# Patient Record
Sex: MALE | Race: WHITE | NOT HISPANIC OR LATINO | Employment: STUDENT | URBAN - METROPOLITAN AREA
[De-identification: names, ages, dates, MRNs, and addresses within clinical notes are randomized per-mention and may not be internally consistent; named-entity substitution may affect disease eponyms.]

---
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
## 2023-02-03 ENCOUNTER — OFFICE VISIT (OUTPATIENT)
Dept: PSYCHIATRY | Facility: CLINIC | Age: 18
End: 2023-02-03
Payer: COMMERCIAL

## 2023-02-03 VITALS
HEIGHT: 69 IN | SYSTOLIC BLOOD PRESSURE: 140 MMHG | BODY MASS INDEX: 26.48 KG/M2 | DIASTOLIC BLOOD PRESSURE: 87 MMHG | HEART RATE: 64 BPM | WEIGHT: 178.8 LBS

## 2023-02-03 DIAGNOSIS — F51.05 INSOMNIA DUE TO MENTAL DISORDER: ICD-10-CM

## 2023-02-03 DIAGNOSIS — F90.0 ADHD (ATTENTION DEFICIT HYPERACTIVITY DISORDER), INATTENTIVE TYPE: Primary | ICD-10-CM

## 2023-02-03 PROCEDURE — 99214 OFFICE O/P EST MOD 30 MIN: CPT | Performed by: NURSE PRACTITIONER

## 2023-02-03 PROCEDURE — 90833 PSYTX W PT W E/M 30 MIN: CPT | Performed by: NURSE PRACTITIONER

## 2023-02-03 RX ORDER — DEXTROAMPHETAMINE SACCHARATE, AMPHETAMINE ASPARTATE, DEXTROAMPHETAMINE SULFATE AND AMPHETAMINE SULFATE 3.75; 3.75; 3.75; 3.75 MG/1; MG/1; MG/1; MG/1
15 TABLET ORAL 2 TIMES DAILY
Qty: 60 TABLET | Refills: 0 | Status: SHIPPED | OUTPATIENT
Start: 2023-02-03 | End: 2023-03-05

## 2023-02-03 NOTE — PROGRESS NOTES
Subjective   Johnathan Fan is a 17 y.o. male who presents today for follow up.   This provider is located at [address] [place] [address], [address] in [place], [place]. In-person visit consisting of the patient and I only. The patient is accepting of and agreeable to this appointment.       Chief Complaint:  Inattention, insomnia    History of Present Illness:     Depression over the past month  Depressed mood: n   Markedly diminished interest or pleasure: n  Significant weight loss when not dieting or weight gain, or decrease or increase in appetite: n  Insomnia or hypersomnia: n  Psychomotor agitation or retardation: n  Fatigue or loss of energy: n  Feelings of worthlessness or excessive or inappropriate guilt: n  Diminished ability to think or concentrate, or indecisiveness: n  Recurrent thoughts of death, recurrent suicidal ideation without a specific plan, or suicide attempt or specific plan for committing suicide- denies    ADHD:   Inattention:   Often fails to give close attention to details or makes careless mistakes in schoolwork, at work, or during other activities- n  Often has difficulty sustaining attention in tasks- n  Often does not seem to listen when spoken to directly- n  Often does not follow through on instructions and fails to finish duties in the workplace- n  Often has difficulty organizing tasks and activities- n  Often avoids, dislikes or is reluctant to engage in tasks that require sustained mental effort- n  Often loses things necessary for tasks or activities- n  Is often easily distracted by extraneous stimuli- n  Is often forgetful in daily activities- n  Hyperactivity and Impulsivity:   Often fidgets with or taps hands or feet-n  Often leaves seat in situations when remaining seated is expected- n  Often feels restless- n  Is often “on the go”, acting as if “driven by a motor”- n  Often talks excessively- n  Often blurts out an answer before a question has been completed- n  Often  has difficulty waiting their turn- n  Often interrupts or intrudes on others- n    9/27/22 ADHD:   Elementary School   Grades- poor grades   Special classes or failures- denies   Behavioral issues- endorses   Referral for ADHD testing- denies  FHX- Denies  Presently:   Problems with attention to detail- y  Problems with sustained attention- y  Problems listening when spoken to directly- y  Failure to finish tasks- y  Avoids tasks that require sustained mental effort- y  Easily distracted- y  Forgetting things- y  Losing things- y  Hard to organize- y  Talks a lot and cutting people off- y  Drifts off during conversations- y  Reading- y    Psychiatric Review of Systems: Patient denies any current or previous depression, anxiety, hallucinations/delusions, paranoia, manic symptoms or PTSD.     PHQ-9 Depression Screening  PHQ-9 Total Score:      Little interest or pleasure in doing things?     Feeling down, depressed, or hopeless?     Trouble falling or staying asleep, or sleeping too much?     Feeling tired or having little energy?     Poor appetite or overeating?     Feeling bad about yourself - or that you are a failure or have let yourself or your family down?     Trouble concentrating on things, such as reading the newspaper or watching television?     Moving or speaking so slowly that other people could have noticed? Or the opposite - being so fidgety or restless that you have been moving around a lot more than usual?     Thoughts that you would be better off dead, or of hurting yourself in some way?     PHQ-9 Total Score       ASHLEY-7         Past Surgical History:  Past Surgical History:   Procedure Laterality Date   • WISDOM TOOTH EXTRACTION         Problem List:  Patient Active Problem List   Diagnosis   • Cough   • Seasonal allergies   • Congestion of nasal sinus   • Lack of concentration       Allergy:   No Known Allergies     Discontinued Medications:  There are no discontinued medications.    Current  Medications:   Current Outpatient Medications   Medication Sig Dispense Refill   • cetirizine (zyrTEC) 10 MG tablet Take 1 tablet by mouth Daily. (Patient taking differently: Take 10 mg by mouth Daily As Needed.) 90 tablet 3   • fluticasone (Flonase) 50 MCG/ACT nasal spray 2 sprays into the nostril(s) as directed by provider Daily. (Patient taking differently: 2 sprays into the nostril(s) as directed by provider Daily As Needed.) 16 g 3   • amphetamine-dextroamphetamine (Adderall) 15 MG tablet Take 1 tablet by mouth 2 (Two) Times a Day for 30 days. 60 tablet 0     No current facility-administered medications for this visit.       Past Medical History:  History reviewed. No pertinent past medical history.    Past Psychiatric History:  Began Treatment: 2022  Diagnoses: ADHD  Psychiatrist: Beatriz  Therapist: Denies  Admission History: Denies  Medication Trials: Strattera, wellbutrin  Self Harm: Denies  Suicide Attempts: Denies    Substance Abuse History:   Types: Denies  Withdrawal Symptoms: Not applicable  Longest Period Sober: Not applicable  AA: Not applicable    Social History:  Martial Status: Single  Employed: [employer]  Kids: Denies  House: Parents   History: Denies    Social History     Socioeconomic History   • Marital status: Single   Tobacco Use   • Smoking status: Never   • Smokeless tobacco: Never   Vaping Use   • Vaping Use: Never used   Substance and Sexual Activity   • Alcohol use: Yes     Comment: OCCASIONALLY   • Drug use: Never   • Sexual activity: Defer       Family History:   Suicide Attempts: Denies  Suicide Completions: Denies      Family History   Problem Relation Age of Onset   • Hypertension Father        Developmental History:   Born: [place]  Siblings: Brother  Childhood: Denies  High School: Graduate  College: Currently in college at [employer] with major in Business Management    Access to Firearms: Denies    Mental Status Exam:     Appearance: good eye contact, normal street clothes,  "groomed, sitting in chair   Behavior: pleasant and cooperative  Motor: no abnormal  Speech: normal rhythm, rate, volume, tone, not hyperverbal, not pressured, normal prosidy  Mood: \"Okay\"  Affect: euthymic  Thought Content: negative suicidal ideations, negative homicidal ideations, negative obsessions  Perceptions: negative auditory hallucinations, negative visual hallucinations, negative delusions, negative paranoia  Thought Process: goal directed, linear  Insight/Judgement: fair/fair  Cognition: grossly intact  Attention: intact  Orientation: person, place, time and situation  Memory: intact    Review of Systems:     Constitutional: Denies fatigue, night sweats  Eyes: Denies double vision, blurred vision  HENT: Denies vertigo, recent head injury  Cardiovascular: Denies chest pain, irregular heartbeats  Respiratory: Denies productive cough, shortness of breath  Gastrointestinal: Denies nausea, vomiting  Genitourinary: Denies dysuria, urinary retention  Integument: Denies hair growth change, new skin lesions  Neurologic: Denies altered mental status, seizures  Musculoskeletal: Denies joint swelling, limitation of motion  Endocrine: Denies cold intolerance, heat intolerance  Psychiatric: Admits anxiety, depression.  Denies psychosis, artem, post-traumatic stress disorder, obsessive compulsive disorder.   Allergic-immunologic: Denies frequent illnesses    Vital Signs:   /87   Pulse 64   Ht 175.3 cm (69\")   Wt 81.1 kg (178 lb 12.8 oz)   BMI 26.40 kg/m²      Lab Results:   Clinical Support on 09/27/2022   Component Date Value Ref Range Status   • Amphet/Methamphet, Screen 09/27/2022 Negative  Negative Final   • Barbiturates Screen, Urine 09/27/2022 Negative  Negative Final   • Benzodiazepine Screen, Urine 09/27/2022 Negative  Negative Final   • Cocaine Screen, Urine 09/27/2022 Negative  Negative Final   • Opiate Screen 09/27/2022 Negative  Negative Final   • THC, Screen, Urine 09/27/2022 Negative  Negative " Final   • Methadone Screen, Urine 09/27/2022 Negative  Negative Final   • Oxycodone Screen, Urine 09/27/2022 Negative  Negative Final   Office Visit on 05/17/2022   Component Date Value Ref Range Status   • SARS Antigen 05/17/2022 Not Detected  Not Detected, Presumptive Negative Final   • Influenza A Antigen JB 05/17/2022 Not Detected  Not Detected Final   • Influenza B Antigen JB 05/17/2022 Not Detected  Not Detected Final   • Internal Control 05/17/2022 Passed  Passed Final   • Lot Number 05/17/2022 [id number]   Final   • Expiration Date 05/17/2022 10/28/2022   Final   Lab on 02/14/2022   Component Date Value Ref Range Status   • Glucose 02/14/2022 83  65 - 99 mg/dL Final   • BUN 02/14/2022 13  6 - 20 mg/dL Final   • Creatinine 02/14/2022 1.06  0.76 - 1.27 mg/dL Final   • Sodium 02/14/2022 138  136 - 145 mmol/L Final   • Potassium 02/14/2022 4.3  3.5 - 5.2 mmol/L Final    Slight hemolysis detected by analyzer. Results may be affected.   • Chloride 02/14/2022 101  98 - 107 mmol/L Final   • CO2 02/14/2022 27.8  22.0 - 29.0 mmol/L Final   • Calcium 02/14/2022 9.7  8.6 - 10.5 mg/dL Final   • Total Protein 02/14/2022 7.4  6.0 - 8.5 g/dL Final   • Albumin 02/14/2022 5.00  3.50 - 5.20 g/dL Final   • ALT (SGPT) 02/14/2022 17  1 - 41 U/L Final   • AST (SGOT) 02/14/2022 20  1 - 40 U/L Final    Slight hemolysis detected by analyzer. Results may be affected.   • Alkaline Phosphatase 02/14/2022 95  56 - 127 U/L Final   • Total Bilirubin 02/14/2022 0.6  0.0 - 1.2 mg/dL Final   • eGFR Non  Amer 02/14/2022 91  >60 mL/min/1.73 Final   • Globulin 02/14/2022 2.4  gm/dL Final   • A/G Ratio 02/14/2022 2.1  g/dL Final   • BUN/Creatinine Ratio 02/14/2022 12.3  7.0 - 25.0 Final   • Anion Gap 02/14/2022 9.2  5.0 - 15.0 mmol/L Final   • TSH 02/14/2022 1.430  0.270 - 4.200 uIU/mL Final   • Hepatitis C Ab 02/14/2022 Non-Reactive  Non-Reactive Final   • WBC 02/14/2022 9.29  3.40 - 10.80 10*3/mm3 Final   • RBC 02/14/2022 5.45  4.14 -  5.80 10*6/mm3 Final   • Hemoglobin 02/14/2022 15.7  13.0 - 17.7 g/dL Final   • Hematocrit 02/14/2022 45.8  37.5 - 51.0 % Final   • MCV 02/14/2022 84.0  79.0 - 97.0 fL Final   • MCH 02/14/2022 28.8  26.6 - 33.0 pg Final   • MCHC 02/14/2022 34.3  31.5 - 35.7 g/dL Final   • RDW 02/14/2022 12.6  12.3 - 15.4 % Final   • RDW-SD 02/14/2022 37.8  37.0 - 54.0 fl Final   • MPV 02/14/2022 11.7  6.0 - 12.0 fL Final   • Platelets 02/14/2022 296  140 - 450 10*3/mm3 Final   • Neutrophil % 02/14/2022 75.4  42.7 - 76.0 % Final   • Lymphocyte % 02/14/2022 14.4 (L)  19.6 - 45.3 % Final   • Monocyte % 02/14/2022 8.3  5.0 - 12.0 % Final   • Eosinophil % 02/14/2022 0.8  0.3 - 6.2 % Final   • Basophil % 02/14/2022 0.8  0.0 - 1.5 % Final   • Immature Grans % 02/14/2022 0.3  0.0 - 0.5 % Final   • Neutrophils, Absolute 02/14/2022 7.01 (H)  1.70 - 7.00 10*3/mm3 Final   • Lymphocytes, Absolute 02/14/2022 1.34  0.70 - 3.10 10*3/mm3 Final   • Monocytes, Absolute 02/14/2022 0.77  0.10 - 0.90 10*3/mm3 Final   • Eosinophils, Absolute 02/14/2022 0.07  0.00 - 0.40 10*3/mm3 Final   • Basophils, Absolute 02/14/2022 0.07  0.00 - 0.20 10*3/mm3 Final   • Immature Grans, Absolute 02/14/2022 0.03  0.00 - 0.05 10*3/mm3 Final   • nRBC 02/14/2022 0.0  0.0 - 0.2 /100 WBC Final       EKG Results:  No orders to display       Imaging Results:  No Images in the past 120 days found..      Assessment & Plan   Diagnoses and all orders for this visit:    1. ADHD (attention deficit hyperactivity disorder), inattentive type (Primary)  -     amphetamine-dextroamphetamine (Adderall) 15 MG tablet; Take 1 tablet by mouth 2 (Two) Times a Day for 30 days.  Dispense: 60 tablet; Refill: 0    2. Insomnia due to mental disorder      Continue adderall ir to target ADHD symptoms. Continue melatonin to target insomnia. 16 minutes of supportive psychotherapy with goal to strengthen defenses, promote problems solving, restore adaptive functioning and provide symptom relief. The  therapeutic alliance was strengthened to encourage the patient to express their thoughts and feelings. Esteem building was enhanced through praise, reassurance, normalizing and encouragement. Coping skills were enhanced to build distress tolerance skills and emotional regulation. Allowed patient to freely discuss issues without interruption or judgement with unconditional positive regard, active listening skills, and empathy. Provided a safe, confidential environment to facilitate the development of a positive therapeutic relationship and encourage open, honest communication. Assisted patient in identifying risk factors which would indicate the need for higher level of care including thoughts to harm self or others and/or self-harming behavior and encouraged patient to contact this office, call 911, or present to the nearest emergency room should any of these events occur. Assisted patient in processing session content; acknowledged and normalized patient's thoughts, feelings, and concerns by utilizing a person-centered approach in efforts to build appropriate rapport and a positive therapeutic relationship with open and honest communication. Patient given education on medication side effects, diagnosis/illness and relapse symptoms. Plan to continue supportive psychotherapy in next appointment to provide symptom relief. 4 weeks    Diagnoses: as above  Symptoms: as above  Functional status: good  Mental Status Exam: as above     Treatment plan: medication management and supportive psychotherapy  Prognosis: good  Progress: continued improvement    Visit Diagnoses:    ICD-10-CM ICD-9-CM   1. ADHD (attention deficit hyperactivity disorder), inattentive type  F90.0 314.00   2. Insomnia due to mental disorder  F51.05 300.9     327.02       PLAN:  1. Safety: No acute safety concerns.   2. Therapy: Declines  3. Risk Assessment: Risk of self-harm acutely is low.  Risk factors include recent psychosocial stressors (pandemic).  Protective factors include no family history, denies access to guns/weapons, no present SI, no history of suicide attempts or self-harm in the past, minimal AODA, healthcare seeking, future orientation, willingness to engage in care.  Risk of self-harm chronically is also low, but could be further elevated in the event of treatment noncompliance and/or AODA.  4. Medications: Continue adderall ir 15mg po bid to target ADHD symptoms. Risks, benefits, side effects discussed with patient including elevated heart rate, elevated blood pressure, irritability, insomnia, sexual dysfunction, appetite suppressing properties, psychosis.  After discussion of these risks and benefits, the patient voiced understanding and agreed to proceed. Dereck reviewed, UDS ordered, and controlled substance agreement signed & witnessed. Continue melatonin 5mg po qhs to target insomnia. Risks, benefits, side effects discussed with patient including sedation, dizziness/falls risk, GI upset.  After discussion of these risks and benefits, the patient voiced understanding and agreed to proceed.   5. Labs/studies: UDS 9/27/22 negative  6. Follow-up: 8 weeks    Patient screened positive for depression based on a PHQ-9 score of 11 on 9/27/2022. Follow-up recommendations include: Suicide Risk Assessment performed.         TREATMENT PLAN/GOALS: Continue supportive psychotherapy efforts and medications as indicated. Treatment and medication options discussed during today's visit. Patient ackowledged and verbally consented to continue with current treatment plan and was educated on the importance of compliance with treatment and follow-up appointments.      MEDICATION ISSUES:  DERECK reviewed as expected.  Discussed medication options and treatment plan of prescribed medication as well as the risks, benefits, and side effects including potential falls, possible impaired driving and metabolic adversities among others. Patient is agreeable to call the  office with any worsening of symptoms or onset of side effects. Patient is agreeable to call 911 or go to the nearest ER should he/she begin having SI/HI. No medication side effects or related complaints today.     MEDS ORDERED DURING VISIT:  New Medications Ordered This Visit   Medications   • amphetamine-dextroamphetamine (Adderall) 15 MG tablet     Sig: Take 1 tablet by mouth 2 (Two) Times a Day for 30 days.     Dispense:  60 tablet     Refill:  0       Return in about 8 weeks (around 3/31/2023) for Next scheduled follow up.         This document has been electronically signed by ANNIA Park  February 3, 2023 08:19 EST      Part of this note may be an electronic transcription/translation of spoken language to printed text using the Dragon Dictation System.

## 2023-02-08 ENCOUNTER — OFFICE VISIT (OUTPATIENT)
Dept: INTERNAL MEDICINE | Facility: CLINIC | Age: 18
End: 2023-02-08
Payer: COMMERCIAL

## 2023-02-08 VITALS
DIASTOLIC BLOOD PRESSURE: 80 MMHG | SYSTOLIC BLOOD PRESSURE: 139 MMHG | HEIGHT: 69 IN | TEMPERATURE: 98.1 F | BODY MASS INDEX: 26.33 KG/M2 | WEIGHT: 177.8 LBS | OXYGEN SATURATION: 97 % | HEART RATE: 82 BPM

## 2023-02-08 DIAGNOSIS — M25.531 RIGHT WRIST PAIN: Primary | ICD-10-CM

## 2023-02-08 PROCEDURE — 99213 OFFICE O/P EST LOW 20 MIN: CPT | Performed by: NURSE PRACTITIONER

## 2023-02-08 NOTE — PROGRESS NOTES
"Chief Complaint  right hand/wrist pain (He states his wrist started hurting 2 weeks ago. He states he doesn't remember hurting it in any way. He does work in [employer] and goes to the gym.)  Subjective      History of Present Illness  Johnathan Fan is a 17 y.o. male who presents to [facility] for complaint of right wrist pain for 2 weeks. The pain is located on the ulnar side and only occurs with a twisting motion. He is right-hand dominant. He denies a fall or known injury.  He does [employer] for work and had been going to the gym and lifting weights. He has stopped lifting weights. He has used Tylenol.      Past Medical History:   Diagnosis Date   • Seasonal allergies 8/31/2022        Past Surgical History:   Procedure Laterality Date   • WISDOM TOOTH EXTRACTION          No Known Allergies       Current Outpatient Medications:   •  amphetamine-dextroamphetamine (Adderall) 15 MG tablet, Take 1 tablet by mouth 2 (Two) Times a Day for 30 days., Disp: 60 tablet, Rfl: 0  •  cetirizine (zyrTEC) 10 MG tablet, Take 1 tablet by mouth Daily. (Patient taking differently: Take 10 mg by mouth Daily As Needed.), Disp: 90 tablet, Rfl: 3  •  fluticasone (Flonase) 50 MCG/ACT nasal spray, 2 sprays into the nostril(s) as directed by provider Daily. (Patient taking differently: 2 sprays into the nostril(s) as directed by provider Daily As Needed.), Disp: 16 g, Rfl: 3  •  diclofenac (VOLTAREN) 50 MG EC tablet, Take 1 tablet by mouth 2 (Two) Times a Day. Take with food., Disp: 60 tablet, Rfl: 0    Objective   /80 (BP Location: Right arm, Patient Position: Sitting, Cuff Size: Large Adult)   Pulse 82   Temp 98.1 °F (36.7 °C) (Temporal)   Ht 175.3 cm (69\")   Wt 80.6 kg (177 lb 12.8 oz)   SpO2 97%   BMI 26.26 kg/m²    Estimated body mass index is 26.26 kg/m² as calculated from the following:    Height as of this encounter: 175.3 cm (69\").    Weight as of this encounter: 80.6 kg (177 " lb 12.8 oz).   Physical Exam  Vitals reviewed.   Constitutional:       General: He is not in acute distress.  HENT:      Head: Normocephalic and atraumatic.   Pulmonary:      Effort: Pulmonary effort is normal.   Musculoskeletal:      Right wrist: Tenderness present. No swelling or deformity. Normal range of motion.      Comments: Tenderness to palpation of wrist on ulnar side; pain with rotation.   Neurological:      General: No focal deficit present.      Mental Status: He is alert.   Psychiatric:         Thought Content: Thought content normal.           Assessment and Plan   Diagnoses and all orders for this visit:    1. Right wrist pain (Primary)    Other orders  -     diclofenac (VOLTAREN) 50 MG EC tablet; Take 1 tablet by mouth 2 (Two) Times a Day. Take with food.  Dispense: 60 tablet; Refill: 0        Differential diagnosis discussed including strain, tendonitis, etc. Start diclofenac 50 mg twice a day with food. Ice/heat several times a day. Use a thumb spica plint or wrist compression to help immobilize during work. No weight lifting for now. Once improved start rehab exercises. Education on medication and treatment plan.    Patient was given instructions and counseling regarding his condition. Please see specific information pulled into the AVS if appropriate.     Follow Up   Return if symptoms worsen or fail to improve.    ANNIA Darling

## 2023-03-08 ENCOUNTER — TELEPHONE (OUTPATIENT)
Dept: PSYCHIATRY | Facility: CLINIC | Age: 18
End: 2023-03-08
Payer: COMMERCIAL

## 2023-03-08 DIAGNOSIS — F90.0 ADHD (ATTENTION DEFICIT HYPERACTIVITY DISORDER), INATTENTIVE TYPE: Primary | ICD-10-CM

## 2023-03-08 RX ORDER — DEXTROAMPHETAMINE SACCHARATE, AMPHETAMINE ASPARTATE, DEXTROAMPHETAMINE SULFATE AND AMPHETAMINE SULFATE 3.75; 3.75; 3.75; 3.75 MG/1; MG/1; MG/1; MG/1
15 TABLET ORAL 2 TIMES DAILY
Qty: 60 TABLET | Refills: 0 | Status: SHIPPED | OUTPATIENT
Start: 2023-03-08 | End: 2023-04-07

## 2023-03-08 NOTE — TELEPHONE ENCOUNTER
Pt left voicemail to request refill for Adderall 15mg.     amphetamine-dextroamphetamine (Adderall) 15 MG tablet (2023)    Pt has upcoming appt on 2023.     Please reorder medication to [facility], as the active order has  off of pt's active medication list.

## 2023-04-07 DIAGNOSIS — F90.0 ADHD (ATTENTION DEFICIT HYPERACTIVITY DISORDER), INATTENTIVE TYPE: ICD-10-CM

## 2023-04-07 RX ORDER — DEXTROAMPHETAMINE SACCHARATE, AMPHETAMINE ASPARTATE, DEXTROAMPHETAMINE SULFATE AND AMPHETAMINE SULFATE 3.75; 3.75; 3.75; 3.75 MG/1; MG/1; MG/1; MG/1
TABLET ORAL
Qty: 60 TABLET | Refills: 0 | Status: SHIPPED | OUTPATIENT
Start: 2023-04-07

## 2023-05-12 ENCOUNTER — OFFICE VISIT (OUTPATIENT)
Dept: PSYCHIATRY | Facility: CLINIC | Age: 18
End: 2023-05-12
Payer: COMMERCIAL

## 2023-05-12 VITALS
HEIGHT: 69 IN | WEIGHT: 172 LBS | SYSTOLIC BLOOD PRESSURE: 125 MMHG | DIASTOLIC BLOOD PRESSURE: 71 MMHG | BODY MASS INDEX: 25.48 KG/M2 | HEART RATE: 69 BPM

## 2023-05-12 DIAGNOSIS — F51.05 INSOMNIA DUE TO MENTAL DISORDER: ICD-10-CM

## 2023-05-12 DIAGNOSIS — F90.0 ADHD (ATTENTION DEFICIT HYPERACTIVITY DISORDER), INATTENTIVE TYPE: Primary | ICD-10-CM

## 2023-05-12 RX ORDER — DEXTROAMPHETAMINE SACCHARATE, AMPHETAMINE ASPARTATE, DEXTROAMPHETAMINE SULFATE AND AMPHETAMINE SULFATE 5; 5; 5; 5 MG/1; MG/1; MG/1; MG/1
20 TABLET ORAL 2 TIMES DAILY
Qty: 60 TABLET | Refills: 0 | Status: SHIPPED | OUTPATIENT
Start: 2023-05-12 | End: 2023-06-11

## 2023-05-12 NOTE — PROGRESS NOTES
Subjective   Johnathan Fan is a 18 y.o. male who presents today for follow up.   This provider is located at [address] [place] [address], [address] in [place], [place]. In-person visit consisting of the patient and I only. The patient is accepting of and agreeable to this appointment.       Chief Complaint:  Inattention, insomnia    History of Present Illness:     1. Sleep disturbance: denies  2. Low energy: denies  3. Appetite change: denies  4. Medication compliant  5. Side effects: denies  6. Refills needed  7. Denies SI HI AVH    ADHD: feel as though wearing off earlier in the day  Inattention:   Often fails to give close attention to details or makes careless mistakes in schoolwork, at work, or during other activities- n  Often has difficulty sustaining attention in tasks- n  Often does not seem to listen when spoken to directly- n  Often does not follow through on instructions and fails to finish duties in the workplace- n  Often has difficulty organizing tasks and activities- n  Often avoids, dislikes or is reluctant to engage in tasks that require sustained mental effort- n  Often loses things necessary for tasks or activities- n  Is often easily distracted by extraneous stimuli- n  Is often forgetful in daily activities- n  Hyperactivity and Impulsivity:   Often fidgets with or taps hands or feet-n  Often leaves seat in situations when remaining seated is expected- n  Often feels restless- n  Is often “on the go”, acting as if “driven by a motor”- n  Often talks excessively- n  Often blurts out an answer before a question has been completed- n  Often has difficulty waiting their turn- n  Often interrupts or intrudes on others- n    9/27/22 ADHD:   Elementary School   Grades- poor grades   Special classes or failures- denies   Behavioral issues- endorses   Referral for ADHD testing- denies  FHX- Denies  Presently:   Problems with attention to detail- y  Problems with sustained attention- y  Problems  listening when spoken to directly- y  Failure to finish tasks- y  Avoids tasks that require sustained mental effort- y  Easily distracted- y  Forgetting things- y  Losing things- y  Hard to organize- y  Talks a lot and cutting people off- y  Drifts off during conversations- y  Reading- y    Psychiatric Review of Systems: Patient denies any current or previous depression, anxiety, hallucinations/delusions, paranoia, manic symptoms or PTSD.     PHQ-9 Depression Screening  PHQ-9 Total Score:      Little interest or pleasure in doing things?     Feeling down, depressed, or hopeless?     Trouble falling or staying asleep, or sleeping too much?     Feeling tired or having little energy?     Poor appetite or overeating?     Feeling bad about yourself - or that you are a failure or have let yourself or your family down?     Trouble concentrating on things, such as reading the newspaper or watching television?     Moving or speaking so slowly that other people could have noticed? Or the opposite - being so fidgety or restless that you have been moving around a lot more than usual?     Thoughts that you would be better off dead, or of hurting yourself in some way?     PHQ-9 Total Score       ASHLEY-7         Past Surgical History:  Past Surgical History:   Procedure Laterality Date   • WISDOM TOOTH EXTRACTION         Problem List:  Patient Active Problem List   Diagnosis   • Cough   • Seasonal allergies   • Congestion of nasal sinus   • Lack of concentration       Allergy:   No Known Allergies     Discontinued Medications:  Medications Discontinued During This Encounter   Medication Reason   • amphetamine-dextroamphetamine (ADDERALL) 15 MG tablet Historical Med - Therapy completed       Current Medications:   Current Outpatient Medications   Medication Sig Dispense Refill   • cetirizine (zyrTEC) 10 MG tablet Take 1 tablet by mouth Daily. (Patient taking differently: Take 1 tablet by mouth Daily As Needed.) 90 tablet 3   •  fluticasone (Flonase) 50 MCG/ACT nasal spray 2 sprays into the nostril(s) as directed by provider Daily. (Patient taking differently: 2 sprays into the nostril(s) as directed by provider Daily As Needed.) 16 g 3   • amphetamine-dextroamphetamine (Adderall) 20 MG tablet Take 1 tablet by mouth 2 (Two) Times a Day for 30 days. 60 tablet 0     No current facility-administered medications for this visit.       Past Medical History:  Past Medical History:   Diagnosis Date   • Seasonal allergies 8/31/2022       Past Psychiatric History:  Began Treatment: 2022  Diagnoses: ADHD  Psychiatrist: Beatriz  Therapist: Denies  Admission History: Denies  Medication Trials: Strattera, wellbutrin  Self Harm: Denies  Suicide Attempts: Denies    Substance Abuse History:   Types: Denies  Withdrawal Symptoms: Not applicable  Longest Period Sober: Not applicable  AA: Not applicable    Social History:  Martial Status: Single  Employed: [employer]  Kids: Denies  House: Parents   History: Denies    Social History     Socioeconomic History   • Marital status: Single   Tobacco Use   • Smoking status: Never     Passive exposure: Never   • Smokeless tobacco: Never   Vaping Use   • Vaping Use: Never used   Substance and Sexual Activity   • Alcohol use: Yes     Comment: OCCASIONALLY   • Drug use: Never   • Sexual activity: Defer       Family History:   Suicide Attempts: Denies  Suicide Completions: Denies      Family History   Problem Relation Age of Onset   • Hypertension Father        Developmental History:   Born: [place]  Siblings: Brother  Childhood: Denies  High School: Graduate  College: Currently in college at [employer] with major in Business Management    Access to Firearms: Denies    Mental Status Exam:     Appearance: good eye contact, normal street clothes, groomed, sitting in chair   Behavior: pleasant and cooperative  Motor: no abnormal  Speech: normal rhythm, rate, volume, tone, not hyperverbal, not pressured, normal prosidy  Mood:  "\"Okay\"  Affect: euthymic  Thought Content: negative suicidal ideations, negative homicidal ideations, negative obsessions  Perceptions: negative auditory hallucinations, negative visual hallucinations, negative delusions, negative paranoia  Thought Process: goal directed, linear  Insight/Judgement: fair/fair  Cognition: grossly intact  Attention: intact  Orientation: person, place, time and situation  Memory: intact    Review of Systems:     Constitutional: Denies fatigue, night sweats  Eyes: Denies double vision, blurred vision  HENT: Denies vertigo, recent head injury  Cardiovascular: Denies chest pain, irregular heartbeats  Respiratory: Denies productive cough, shortness of breath  Gastrointestinal: Denies nausea, vomiting  Genitourinary: Denies dysuria, urinary retention  Integument: Denies hair growth change, new skin lesions  Neurologic: Denies altered mental status, seizures  Musculoskeletal: Denies joint swelling, limitation of motion  Endocrine: Denies cold intolerance, heat intolerance  Psychiatric: Admits anxiety, depression.  Denies psychosis, artem, post-traumatic stress disorder, obsessive compulsive disorder.   Allergic-immunologic: Denies frequent illnesses    Vital Signs:   /71   Pulse 69   Ht 175.3 cm (69\")   Wt 78 kg (172 lb)   BMI 25.40 kg/m²      Lab Results:   Admission on 03/27/2023, Discharged on 03/27/2023   Component Date Value Ref Range Status   • Rapid Strep A Screen 03/27/2023 Positive (A)  Negative, VALID, INVALID, Not Performed Final   • Internal Control 03/27/2023 Passed  Passed Final   • Lot Number 03/27/2023 [id number]   Final   • Expiration Date 03/27/2023 [id number]   Final   Clinical Support on 09/27/2022   Component Date Value Ref Range Status   • Amphet/Methamphet, Screen 09/27/2022 Negative  Negative Final   • Barbiturates Screen, Urine 09/27/2022 Negative  Negative Final   • Benzodiazepine Screen, Urine 09/27/2022 Negative  Negative Final   • Cocaine Screen, Urine " 09/27/2022 Negative  Negative Final   • Opiate Screen 09/27/2022 Negative  Negative Final   • THC, Screen, Urine 09/27/2022 Negative  Negative Final   • Methadone Screen, Urine 09/27/2022 Negative  Negative Final   • Oxycodone Screen, Urine 09/27/2022 Negative  Negative Final   Office Visit on 05/17/2022   Component Date Value Ref Range Status   • SARS Antigen 05/17/2022 Not Detected  Not Detected, Presumptive Negative Final   • Influenza A Antigen JB 05/17/2022 Not Detected  Not Detected Final   • Influenza B Antigen JB 05/17/2022 Not Detected  Not Detected Final   • Internal Control 05/17/2022 Passed  Passed Final   • Lot Number 05/17/2022 [id number]   Final   • Expiration Date 05/17/2022 10/28/2022   Final       EKG Results:  No orders to display       Imaging Results:  No Images in the past 120 days found..      Assessment & Plan   Diagnoses and all orders for this visit:    1. ADHD (attention deficit hyperactivity disorder), inattentive type (Primary)  -     amphetamine-dextroamphetamine (Adderall) 20 MG tablet; Take 1 tablet by mouth 2 (Two) Times a Day for 30 days.  Dispense: 60 tablet; Refill: 0    2. Insomnia due to mental disorder      Increase adderall ir to target ADHD symptoms. Continue melatonin to target insomnia. 16 minutes of supportive psychotherapy with goal to strengthen defenses, promote problems solving, restore adaptive functioning and provide symptom relief. The therapeutic alliance was strengthened to encourage the patient to express their thoughts and feelings. Esteem building was enhanced through praise, reassurance, normalizing and encouragement. Coping skills were enhanced to build distress tolerance skills and emotional regulation. Allowed patient to freely discuss issues without interruption or judgement with unconditional positive regard, active listening skills, and empathy. Provided a safe, confidential environment to facilitate the development of a positive therapeutic  relationship and encourage open, honest communication. Assisted patient in identifying risk factors which would indicate the need for higher level of care including thoughts to harm self or others and/or self-harming behavior and encouraged patient to contact this office, call 911, or present to the nearest emergency room should any of these events occur. Assisted patient in processing session content; acknowledged and normalized patient's thoughts, feelings, and concerns by utilizing a person-centered approach in efforts to build appropriate rapport and a positive therapeutic relationship with open and honest communication. Patient given education on medication side effects, diagnosis/illness and relapse symptoms. Plan to continue supportive psychotherapy in next appointment to provide symptom relief. 4 weeks    Diagnoses: as above  Symptoms: as above  Functional status: good  Mental Status Exam: as above     Treatment plan: medication management and supportive psychotherapy  Prognosis: good  Progress: continued improvement    Visit Diagnoses:    ICD-10-CM ICD-9-CM   1. ADHD (attention deficit hyperactivity disorder), inattentive type  F90.0 314.00   2. Insomnia due to mental disorder  F51.05 300.9     327.02       PLAN:  8. Safety: No acute safety concerns.   9. Therapy: Declines  10. Risk Assessment: Risk of self-harm acutely is low.  Risk factors include recent psychosocial stressors (pandemic). Protective factors include no family history, denies access to guns/weapons, no present SI, no history of suicide attempts or self-harm in the past, minimal AODA, healthcare seeking, future orientation, willingness to engage in care.  Risk of self-harm chronically is also low, but could be further elevated in the event of treatment noncompliance and/or AODA.  11. Medications: Increase adderall ir 15mg to 20mg po bid to target ADHD symptoms. Risks, benefits, side effects discussed with patient including elevated heart  rate, elevated blood pressure, irritability, insomnia, sexual dysfunction, appetite suppressing properties, psychosis.  After discussion of these risks and benefits, the patient voiced understanding and agreed to proceed. Dereck reviewed, UDS ordered, and controlled substance agreement signed & witnessed. Continue melatonin 5mg po qhs to target insomnia. Risks, benefits, side effects discussed with patient including sedation, dizziness/falls risk, GI upset.  After discussion of these risks and benefits, the patient voiced understanding and agreed to proceed.   12. Labs/studies: UDS 9/27/22 negative  13. Follow-up: 4 weeks    Patient screened positive for depression based on a PHQ-9 score of 11 on 9/27/2022. Follow-up recommendations include: Suicide Risk Assessment performed.         TREATMENT PLAN/GOALS: Continue supportive psychotherapy efforts and medications as indicated. Treatment and medication options discussed during today's visit. Patient ackowledged and verbally consented to continue with current treatment plan and was educated on the importance of compliance with treatment and follow-up appointments.      MEDICATION ISSUES:  DERECK reviewed as expected.  Discussed medication options and treatment plan of prescribed medication as well as the risks, benefits, and side effects including potential falls, possible impaired driving and metabolic adversities among others. Patient is agreeable to call the office with any worsening of symptoms or onset of side effects. Patient is agreeable to call 911 or go to the nearest ER should he/she begin having SI/HI. No medication side effects or related complaints today.     MEDS ORDERED DURING VISIT:  New Medications Ordered This Visit   Medications   • amphetamine-dextroamphetamine (Adderall) 20 MG tablet     Sig: Take 1 tablet by mouth 2 (Two) Times a Day for 30 days.     Dispense:  60 tablet     Refill:  0     Generic only. Do not substitute.       Return in about 4  weeks (around 6/9/2023) for Next scheduled follow up.         This document has been electronically signed by ANNIA Park  May 12, 2023 15:16 EDT      Part of this note may be an electronic transcription/translation of spoken language to printed text using the Dragon Dictation System.

## 2023-06-09 ENCOUNTER — TELEMEDICINE (OUTPATIENT)
Dept: PSYCHIATRY | Facility: CLINIC | Age: 18
End: 2023-06-09
Payer: COMMERCIAL

## 2023-06-09 DIAGNOSIS — F90.0 ADHD (ATTENTION DEFICIT HYPERACTIVITY DISORDER), INATTENTIVE TYPE: Primary | ICD-10-CM

## 2023-06-09 DIAGNOSIS — F51.05 INSOMNIA DUE TO MENTAL DISORDER: ICD-10-CM

## 2023-06-09 RX ORDER — DEXTROAMPHETAMINE SACCHARATE, AMPHETAMINE ASPARTATE, DEXTROAMPHETAMINE SULFATE AND AMPHETAMINE SULFATE 5; 5; 5; 5 MG/1; MG/1; MG/1; MG/1
20 TABLET ORAL 2 TIMES DAILY
Qty: 60 TABLET | Refills: 0 | Status: SHIPPED | OUTPATIENT
Start: 2023-06-10 | End: 2023-07-10

## 2023-06-09 NOTE — PROGRESS NOTES
"Subjective   Johnathan Miguel Fan is a 18 y.o. male who presents today for follow up.   Mode of visit: Video  Location [address] provider[address], [address], [place] [address].  Location of patient: Home  Does the patient consent to use a video/audio connection for your medical care today? Yes  The visit included audio and video interaction. No technical issues occurred during this visit.  This provider is located at [address], [address] in [place], [place].      Chief Complaint:  Inattention, insomnia    History of Present Illness:       Sleep disturbance: has improved  \"Sleeping a lot better\"  Low energy: denies  Low motivation/Interest: denies  Appetite change: denies  Medication compliant  Side effects: denies  Refills needed  Denies SI HI AVH    ADHD:   Inattention:   Often fails to give close attention to details or makes careless mistakes in schoolwork, at work, or during other activities- n  Often has difficulty sustaining attention in tasks- n  Often does not seem to listen when spoken to directly- n  Often does not follow through on instructions and fails to finish duties in the workplace- n  Often has difficulty organizing tasks and activities- n  Often avoids, dislikes or is reluctant to engage in tasks that require sustained mental effort- n  Often loses things necessary for tasks or activities- n  Is often easily distracted by extraneous stimuli- n  Is often forgetful in daily activities- n  Hyperactivity and Impulsivity:   Often fidgets with or taps hands or feet-n  Often leaves seat in situations when remaining seated is expected- n  Often feels restless- n  Is often “on the go”, acting as if “driven by a motor”- n  Often talks excessively- n  Often blurts out an answer before a question has been completed- n  Often has difficulty waiting their turn- n  Often interrupts or intrudes on others- n    9/27/22 ADHD:   Elementary School   Grades- poor grades   Special classes " or failures- denies   Behavioral issues- endorses   Referral for ADHD testing- denies  FHX- Denies  Presently:   Problems with attention to detail- y  Problems with sustained attention- y  Problems listening when spoken to directly- y  Failure to finish tasks- y  Avoids tasks that require sustained mental effort- y  Easily distracted- y  Forgetting things- y  Losing things- y  Hard to organize- y  Talks a lot and cutting people off- y  Drifts off during conversations- y  Reading- y    Psychiatric Review of Systems: Patient denies any current or previous depression, anxiety, hallucinations/delusions, paranoia, manic symptoms or PTSD.     PHQ-9 Depression Screening  PHQ-9 Total Score:      Little interest or pleasure in doing things?     Feeling down, depressed, or hopeless?     Trouble falling or staying asleep, or sleeping too much?     Feeling tired or having little energy?     Poor appetite or overeating?     Feeling bad about yourself - or that you are a failure or have let yourself or your family down?     Trouble concentrating on things, such as reading the newspaper or watching television?     Moving or speaking so slowly that other people could have noticed? Or the opposite - being so fidgety or restless that you have been moving around a lot more than usual?     Thoughts that you would be better off dead, or of hurting yourself in some way?     PHQ-9 Total Score       ASHLEY-7         Past Surgical History:  Past Surgical History:   Procedure Laterality Date    WISDOM TOOTH EXTRACTION         Problem List:  Patient Active Problem List   Diagnosis    Cough    Seasonal allergies    Congestion of nasal sinus    Lack of concentration       Allergy:   No Known Allergies     Discontinued Medications:  Medications Discontinued During This Encounter   Medication Reason    amphetamine-dextroamphetamine (Adderall) 20 MG tablet Reorder         Current Medications:   Current Outpatient Medications   Medication Sig Dispense  Refill    [START ON 6/10/2023] amphetamine-dextroamphetamine (Adderall) 20 MG tablet Take 1 tablet by mouth 2 (Two) Times a Day for 30 days. 60 tablet 0    cetirizine (zyrTEC) 10 MG tablet Take 1 tablet by mouth Daily. (Patient taking differently: Take 1 tablet by mouth Daily As Needed.) 90 tablet 3    fluticasone (Flonase) 50 MCG/ACT nasal spray 2 sprays into the nostril(s) as directed by provider Daily. (Patient taking differently: 2 sprays into the nostril(s) as directed by provider Daily As Needed.) 16 g 3     No current facility-administered medications for this visit.       Past Medical History:  Past Medical History:   Diagnosis Date    Seasonal allergies 8/31/2022       Past Psychiatric History:  Began Treatment: 2022  Diagnoses: ADHD  Psychiatrist: Beatriz  Therapist: Denies  Admission History: Denies  Medication Trials: Strattera, wellbutrin  Self Harm: Denies  Suicide Attempts: Denies    Substance Abuse History:   Types: Denies  Withdrawal Symptoms: Not applicable  Longest Period Sober: Not applicable  AA: Not applicable    Social History:  Martial Status: Single  Employed: [employer]  Kids: Denies  House: Parents   History: Denies    Social History     Socioeconomic History    Marital status: Single   Tobacco Use    Smoking status: Never     Passive exposure: Never    Smokeless tobacco: Never   Vaping Use    Vaping Use: Never used   Substance and Sexual Activity    Alcohol use: Yes     Comment: OCCASIONALLY    Drug use: Never    Sexual activity: Defer       Family History:   Suicide Attempts: Denies  Suicide Completions: Denies      Family History   Problem Relation Age of Onset    Hypertension Father        Developmental History:   Born: [place]  Siblings: Brother  Childhood: Denies  High School: Graduate  College: Currently in college at [employer] with major in Business Management    Access to Firearms: Denies    Mental Status Exam:     Appearance: good eye contact, normal street clothes, groomed,  "sitting in chair   Behavior: pleasant and cooperative  Motor: no abnormal  Speech: normal rhythm, rate, volume, tone, not hyperverbal, not pressured, normal prosidy  Mood: \"Okay\"  Affect: euthymic  Thought Content: negative suicidal ideations, negative homicidal ideations, negative obsessions  Perceptions: negative auditory hallucinations, negative visual hallucinations, negative delusions, negative paranoia  Thought Process: goal directed, linear  Insight/Judgement: fair/fair  Cognition: grossly intact  Attention: intact  Orientation: person, place, time and situation  Memory: intact    Review of Systems:     Constitutional: Denies fatigue, night sweats  Eyes: Denies double vision, blurred vision  HENT: Denies vertigo, recent head injury  Cardiovascular: Denies chest pain, irregular heartbeats  Respiratory: Denies productive cough, shortness of breath  Gastrointestinal: Denies nausea, vomiting  Genitourinary: Denies dysuria, urinary retention  Integument: Denies hair growth change, new skin lesions  Neurologic: Denies altered mental status, seizures  Musculoskeletal: Denies joint swelling, limitation of motion  Endocrine: Denies cold intolerance, heat intolerance  Psychiatric: Admits inattention.  Denies depression, anxiety, psychosis, artem, post-traumatic stress disorder, obsessive compulsive disorder.   Allergic-immunologic: Denies frequent illnesses    Vital Signs:   There were no vitals taken for this visit.     Lab Results:   Admission on 03/27/2023, Discharged on 03/27/2023   Component Date Value Ref Range Status    Rapid Strep A Screen 03/27/2023 Positive (A)  Negative, VALID, INVALID, Not Performed Final    Internal Control 03/27/2023 Passed  Passed Final    Lot Number 03/27/2023 [id number]   Final    Expiration Date 03/27/2023 9,[id number]   Final   Clinical Support on 09/27/2022   Component Date Value Ref Range Status    Amphet/Methamphet, Screen 09/27/2022 Negative  Negative Final    Barbiturates Screen, " Urine 09/27/2022 Negative  Negative Final    Benzodiazepine Screen, Urine 09/27/2022 Negative  Negative Final    Cocaine Screen, Urine 09/27/2022 Negative  Negative Final    Opiate Screen 09/27/2022 Negative  Negative Final    THC, Screen, Urine 09/27/2022 Negative  Negative Final    Methadone Screen, Urine 09/27/2022 Negative  Negative Final    Oxycodone Screen, Urine 09/27/2022 Negative  Negative Final       EKG Results:  No orders to display       Imaging Results:  No Images in the past 120 days found..      Assessment & Plan   Diagnoses and all orders for this visit:    1. ADHD (attention deficit hyperactivity disorder), inattentive type (Primary)  -     amphetamine-dextroamphetamine (Adderall) 20 MG tablet; Take 1 tablet by mouth 2 (Two) Times a Day for 30 days.  Dispense: 60 tablet; Refill: 0    2. Insomnia due to mental disorder        Continue adderall ir to target ADHD symptoms. Continue melatonin to target insomnia. 16 minutes of supportive psychotherapy with goal to strengthen defenses, promote problems solving, restore adaptive functioning and provide symptom relief. The therapeutic alliance was strengthened to encourage the patient to express their thoughts and feelings. Esteem building was enhanced through praise, reassurance, normalizing and encouragement. Coping skills were enhanced to build distress tolerance skills and emotional regulation. Allowed patient to freely discuss issues without interruption or judgement with unconditional positive regard, active listening skills, and empathy. Provided a safe, confidential environment to facilitate the development of a positive therapeutic relationship and encourage open, honest communication. Assisted patient in identifying risk factors which would indicate the need for higher level of care including thoughts to harm self or others and/or self-harming behavior and encouraged patient to contact this office, call 911, or present to the nearest emergency  room should any of these events occur. Assisted patient in processing session content; acknowledged and normalized patient's thoughts, feelings, and concerns by utilizing a person-centered approach in efforts to build appropriate rapport and a positive therapeutic relationship with open and honest communication. Patient given education on medication side effects, diagnosis/illness and relapse symptoms. Plan to continue supportive psychotherapy in next appointment to provide symptom relief. 4 weeks    Diagnoses: as above  Symptoms: as above  Functional status: good  Mental Status Exam: as above     Treatment plan: medication management and supportive psychotherapy  Prognosis: good  Progress: continued improvement    Visit Diagnoses:    ICD-10-CM ICD-9-CM   1. ADHD (attention deficit hyperactivity disorder), inattentive type  F90.0 314.00   2. Insomnia due to mental disorder  F51.05 300.9     327.02         PLAN:  Safety: No acute safety concerns.   Therapy: Declines  Risk Assessment: Risk of self-harm acutely is low.  Risk factors include recent psychosocial stressors (pandemic). Protective factors include no family history, denies access to guns/weapons, no present SI, no history of suicide attempts or self-harm in the past, minimal AODA, healthcare seeking, future orientation, willingness to engage in care.  Risk of self-harm chronically is also low, but could be further elevated in the event of treatment noncompliance and/or AODA.  Medications: Continue adderall ir 20mg po bid to target ADHD symptoms. Risks, benefits, side effects discussed with patient including elevated heart rate, elevated blood pressure, irritability, insomnia, sexual dysfunction, appetite suppressing properties, psychosis.  After discussion of these risks and benefits, the patient voiced understanding and agreed to proceed. Issa reviewed, UDS ordered, and controlled substance agreement signed & witnessed. Continue melatonin 5mg po qhs to  target insomnia. Risks, benefits, side effects discussed with patient including sedation, dizziness/falls risk, GI upset.  After discussion of these risks and benefits, the patient voiced understanding and agreed to proceed.   Labs/studies: UDS 9/27/22 negative  Follow-up: 4 weeks    Patient screened positive for depression based on a PHQ-9 score of 0 on 6/9/2023. Follow-up recommendations include: Suicide Risk Assessment performed.         TREATMENT PLAN/GOALS: Continue supportive psychotherapy efforts and medications as indicated. Treatment and medication options discussed during today's visit. Patient ackowledged and verbally consented to continue with current treatment plan and was educated on the importance of compliance with treatment and follow-up appointments.      MEDICATION ISSUES:  DERECK reviewed as expected.  Discussed medication options and treatment plan of prescribed medication as well as the risks, benefits, and side effects including potential falls, possible impaired driving and metabolic adversities among others. Patient is agreeable to call the office with any worsening of symptoms or onset of side effects. Patient is agreeable to call 911 or go to the nearest ER should he/she begin having SI/HI. No medication side effects or related complaints today.     MEDS ORDERED DURING VISIT:  New Medications Ordered This Visit   Medications    amphetamine-dextroamphetamine (Adderall) 20 MG tablet     Sig: Take 1 tablet by mouth 2 (Two) Times a Day for 30 days.     Dispense:  60 tablet     Refill:  0     Generic only. Do not substitute.       Return in about 4 weeks (around 7/7/2023) for Next scheduled follow up.         This document has been electronically signed by ANNIA Prak  June 9, 2023 08:56 EDT      Part of this note may be an electronic transcription/translation of spoken language to printed text using the Dragon Dictation System.

## 2023-08-18 DIAGNOSIS — F90.0 ADHD (ATTENTION DEFICIT HYPERACTIVITY DISORDER), INATTENTIVE TYPE: ICD-10-CM

## 2023-08-18 RX ORDER — DEXTROAMPHETAMINE SACCHARATE, AMPHETAMINE ASPARTATE, DEXTROAMPHETAMINE SULFATE AND AMPHETAMINE SULFATE 5; 5; 5; 5 MG/1; MG/1; MG/1; MG/1
20 TABLET ORAL DAILY
Qty: 60 TABLET | Refills: 0 | Status: SHIPPED | OUTPATIENT
Start: 2023-08-18 | End: 2023-08-21 | Stop reason: SDUPTHER

## 2023-08-18 NOTE — TELEPHONE ENCOUNTER
RECEIVED FAX DOCUMENTATION FROM PHARMACY STATING THAT THE PREVIOUS ADDERALL SCRIPT WAS WRITTEN FOR 1 TABLET BID BUT THIS CURRENT RX IS WRITTEN FOR 1 TABLET PO DAILY.    THEY ARE NEEDING CLARIFICATION.     CURRENT RX  amphetamine-dextroamphetamine (ADDERALL) 20 MG tablet (08/18/2023)     PREVIOUS RX  amphetamine-dextroamphetamine (ADDERALL) 20 MG tablet (07/17/2023)

## 2023-08-21 RX ORDER — DEXTROAMPHETAMINE SACCHARATE, AMPHETAMINE ASPARTATE, DEXTROAMPHETAMINE SULFATE AND AMPHETAMINE SULFATE 5; 5; 5; 5 MG/1; MG/1; MG/1; MG/1
20 TABLET ORAL 2 TIMES DAILY
Qty: 60 TABLET | Refills: 0 | Status: SHIPPED | OUTPATIENT
Start: 2023-08-21

## 2023-09-14 ENCOUNTER — TELEMEDICINE (OUTPATIENT)
Dept: PSYCHIATRY | Facility: CLINIC | Age: 18
End: 2023-09-14
Payer: COMMERCIAL

## 2023-09-14 DIAGNOSIS — F51.05 INSOMNIA DUE TO MENTAL DISORDER: ICD-10-CM

## 2023-09-14 DIAGNOSIS — F90.0 ADHD (ATTENTION DEFICIT HYPERACTIVITY DISORDER), INATTENTIVE TYPE: Primary | ICD-10-CM

## 2023-09-14 RX ORDER — DEXTROAMPHETAMINE SACCHARATE, AMPHETAMINE ASPARTATE, DEXTROAMPHETAMINE SULFATE AND AMPHETAMINE SULFATE 5; 5; 5; 5 MG/1; MG/1; MG/1; MG/1
20 TABLET ORAL 2 TIMES DAILY
Qty: 60 TABLET | Refills: 0 | Status: SHIPPED | OUTPATIENT
Start: 2023-09-19

## 2023-09-14 NOTE — PSYCHOTHERAPY NOTE
Supportive psychotherapy with goal to strengthen defenses, promote problems solving, restore adaptive functioning and provide symptom relief. Stressors: Work Related-long hours.  Coping skills utilized: Positive Distraction. Current goal: Talk to a person in your support system. Provided a safe, confidential environment to facilitate the development of a positive therapeutic relationship and encourage open, honest communication. Assisted patient in identifying risk factors which would indicate the need for higher level of care including thoughts to harm self or others and/or self-harming behavior and encouraged patient to contact this office, call 911, or present to the nearest emergency room should any of these events occur. Patient given education on medication side effects, diagnosis/illness and relapse symptoms.  Plan to continue supportive psychotherapy in next appointment to provide symptom relief. At least 20 minutes of coping skill utilization recommended per day. 17 minutes of supportive psychotherapy.    Diagnoses: as above  Symptoms: as above  Functional status: good  Mental Status Exam: as above     Treatment plan: medication management and supportive psychotherapy  Prognosis: good  Progress: Continued Improvement

## 2023-09-14 NOTE — PROGRESS NOTES
"Subjective   Johnathan Miguel Fan is a 18 y.o. male who presents today for follow up.   Mode of visit: Video  Location of provider: Home  Location of patient: Home  Does the patient consent to use a video/audio connection for your medical care today? Yes  The visit included audio and video interaction. No technical issues occurred during this visit.      Chief Complaint:  Inattention, insomnia    History of Present Illness:     Sleep disturbance: n  Work going good- \"booked til end of November\"  Low energy: n  Appetite change: denies  Medication compliant  Side effects: denies  Refills needed  Denies SI HI AVH    ADHD:   Inattention:   Often fails to give close attention to details or makes careless mistakes in schoolwork, at work, or during other activities- n  Often has difficulty sustaining attention in tasks- n  Often does not seem to listen when spoken to directly- n  Often does not follow through on instructions and fails to finish duties in the workplace- n  Often has difficulty organizing tasks and activities- n  Often avoids, dislikes or is reluctant to engage in tasks that require sustained mental effort- n  Often loses things necessary for tasks or activities- n  Is often easily distracted by extraneous stimuli- n  Is often forgetful in daily activities- n  Hyperactivity and Impulsivity:   Often fidgets with or taps hands or feet-n  Often leaves seat in situations when remaining seated is expected- n  Often feels restless- n  Is often “on the go”, acting as if “driven by a motor”- n  Often talks excessively- n  Often blurts out an answer before a question has been completed- n  Often has difficulty waiting their turn- n  Often interrupts or intrudes on others- n    9/27/22 ADHD:   Elementary School   Grades- poor grades   Special classes or failures- denies   Behavioral issues- endorses   Referral for ADHD testing- denies  FHX- Denies  Presently:   Problems with attention to detail- y  Problems with " sustained attention- y  Problems listening when spoken to directly- y  Failure to finish tasks- y  Avoids tasks that require sustained mental effort- y  Easily distracted- y  Forgetting things- y  Losing things- y  Hard to organize- y  Talks a lot and cutting people off- y  Drifts off during conversations- y  Reading- y    Psychiatric Review of Systems: Patient denies any current or previous depression, anxiety, hallucinations/delusions, paranoia, manic symptoms or PTSD.     PHQ-9 Depression Screening  PHQ-9 Total Score:      Little interest or pleasure in doing things?     Feeling down, depressed, or hopeless?     Trouble falling or staying asleep, or sleeping too much?     Feeling tired or having little energy?     Poor appetite or overeating?     Feeling bad about yourself - or that you are a failure or have let yourself or your family down?     Trouble concentrating on things, such as reading the newspaper or watching television?     Moving or speaking so slowly that other people could have noticed? Or the opposite - being so fidgety or restless that you have been moving around a lot more than usual?     Thoughts that you would be better off dead, or of hurting yourself in some way?     PHQ-9 Total Score       ASHLEY-7  Feeling nervous, anxious or on edge: (P) Several days  Not being able to stop or control worrying: (P) Not at all  Worrying too much about different things: (P) Several days  Trouble Relaxing: (P) Not at all  Being so restless that it is hard to sit still: (P) Not at all  Feeling afraid as if something awful might happen: (P) Not at all  Becoming easily annoyed or irritable: (P) Not at all  ASHLEY 7 Total Score: (P) 2  If you checked any problems, how difficult have these problems made it for you to do your work, take care of things at home, or get along with other people: (P) Somewhat difficult      Past Surgical History:  Past Surgical History:   Procedure Laterality Date    WISDOM TOOTH EXTRACTION          Problem List:  Patient Active Problem List   Diagnosis    Cough    Seasonal allergies    Congestion of nasal sinus    Lack of concentration       Allergy:   No Known Allergies     Discontinued Medications:  Medications Discontinued During This Encounter   Medication Reason    amphetamine-dextroamphetamine (ADDERALL) 20 MG tablet Reorder           Current Medications:   Current Outpatient Medications   Medication Sig Dispense Refill    [START ON 9/19/2023] amphetamine-dextroamphetamine (ADDERALL) 20 MG tablet Take 1 tablet by mouth 2 (Two) Times a Day. 60 tablet 0    cetirizine (zyrTEC) 10 MG tablet Take 1 tablet by mouth Daily. (Patient taking differently: Take 1 tablet by mouth Daily As Needed.) 90 tablet 3    fluticasone (Flonase) 50 MCG/ACT nasal spray 2 sprays into the nostril(s) as directed by provider Daily. (Patient taking differently: 2 sprays into the nostril(s) as directed by provider Daily As Needed.) 16 g 3     No current facility-administered medications for this visit.       Past Medical History:  Past Medical History:   Diagnosis Date    Seasonal allergies 8/31/2022       Past Psychiatric History:  Began Treatment: 2022  Diagnoses: ADHD  Psychiatrist: Beatriz  Therapist: Denies  Admission History: Denies  Medication Trials: Strattera, wellbutrin  Self Harm: Denies  Suicide Attempts: Denies    Substance Abuse History:   Types: Denies  Withdrawal Symptoms: Not applicable  Longest Period Sober: Not applicable  AA: Not applicable    Social History:  Martial Status: Single  Employed: [employer]  Kids: Denies  House: Parents   History: Denies    Social History     Socioeconomic History    Marital status: Single   Tobacco Use    Smoking status: Never     Passive exposure: Never    Smokeless tobacco: Never   Vaping Use    Vaping Use: Never used   Substance and Sexual Activity    Alcohol use: Not Currently    Drug use: Never    Sexual activity: Defer       Family History:   Suicide Attempts:  Denies  Suicide Completions: Denies      Family History   Problem Relation Age of Onset    Hypertension Father        Developmental History:   Born: [place]  Siblings: Brother  Childhood: Denies  High School: Graduate  College: Currently in college at [employer] with major in Business Management    Access to Firearms: Denies    Mental Status Exam:   Hygiene:   good  Cooperation:  Cooperative  Eye Contact:  Good  Psychomotor Behavior:  Appropriate  Affect:  Full range  Mood: normal  Hopelessness: Denies  Speech:  Normal  Thought Process:  Linear  Thought Content:  Mood congruent  Suicidal:  None  Homicidal:  None  Hallucinations:  None  Delusion:  None  Memory:  Intact  Orientation:  Person, Place, Time, and Situation  Reliability:  good  Insight:  Good  Judgement:  Good  Impulse Control:  Good  Physical/Medical Issues:  No      Review of Systems:  Review of Systems   Constitutional:  Negative for appetite change, diaphoresis, fatigue and unexpected weight change.   HENT:  Negative for drooling, tinnitus and trouble swallowing.    Eyes:  Negative for visual disturbance.   Respiratory:  Negative for cough, chest tightness and shortness of breath.    Cardiovascular:  Negative for chest pain and palpitations.   Gastrointestinal:  Negative for abdominal pain, constipation, diarrhea, nausea and vomiting.   Endocrine: Negative for cold intolerance and heat intolerance.   Genitourinary:  Negative for difficulty urinating.   Musculoskeletal:  Negative for arthralgias and myalgias.   Skin:  Negative for rash.   Allergic/Immunologic: Negative for immunocompromised state.   Neurological:  Negative for dizziness, tremors, seizures and headaches.   Psychiatric/Behavioral:  Negative for agitation, dysphoric mood, hallucinations, self-injury, sleep disturbance and suicidal ideas. The patient is not nervous/anxious.      Vital Signs:   There were no vitals taken for this visit.     Lab Results:   Admission on 06/13/2023, Discharged on  06/13/2023   Component Date Value Ref Range Status    Rapid Influenza A Ag 06/13/2023 Negative  Negative Final    Rapid Influenza B Ag 06/13/2023 Negative  Negative Final    Internal Control 06/13/2023 Passed  Passed Final    Lot Number 06/13/2023 [id number]   Final    Expiration Date 06/13/2023 10/12/24   Final    Monospot 06/13/2023 Negative   Final    Internal Control 06/13/2023 Passed   Final    Lot Number 06/13/2023 [id number]   Final    Expiration Date 06/13/2023 12/31/24   Final   Admission on 03/27/2023, Discharged on 03/27/2023   Component Date Value Ref Range Status    Rapid Strep A Screen 03/27/2023 Positive (A)  Negative, VALID, INVALID, Not Performed Final    Internal Control 03/27/2023 Passed  Passed Final    Lot Number 03/27/2023 [id number]   Final    Expiration Date 03/27/2023 9,072,024   Final   Clinical Support on 09/27/2022   Component Date Value Ref Range Status    Amphet/Methamphet, Screen 09/27/2022 Negative  Negative Final    Barbiturates Screen, Urine 09/27/2022 Negative  Negative Final    Benzodiazepine Screen, Urine 09/27/2022 Negative  Negative Final    Cocaine Screen, Urine 09/27/2022 Negative  Negative Final    Opiate Screen 09/27/2022 Negative  Negative Final    THC, Screen, Urine 09/27/2022 Negative  Negative Final    Methadone Screen, Urine 09/27/2022 Negative  Negative Final    Oxycodone Screen, Urine 09/27/2022 Negative  Negative Final       EKG Results:  No orders to display       Imaging Results:  No Images in the past 120 days found..      Assessment & Plan   Diagnoses and all orders for this visit:    1. ADHD (attention deficit hyperactivity disorder), inattentive type (Primary)  -     amphetamine-dextroamphetamine (ADDERALL) 20 MG tablet; Take 1 tablet by mouth 2 (Two) Times a Day.  Dispense: 60 tablet; Refill: 0    2. Insomnia due to mental disorder      Continue adderall ir to target ADHD symptoms. Continue melatonin to target insomnia. Supportive psychotherapy- see next note.      Visit Diagnoses:    ICD-10-CM ICD-9-CM   1. ADHD (attention deficit hyperactivity disorder), inattentive type  F90.0 314.00   2. Insomnia due to mental disorder  F51.05 300.9     327.02     PLAN:  Safety: No acute safety concerns.   Therapy: Declines  Risk Assessment: Risk of self-harm acutely is low.  Risk factors include recent psychosocial stressors (pandemic). Protective factors include no family history, denies access to guns/weapons, no present SI, no history of suicide attempts or self-harm in the past, minimal AODA, healthcare seeking, future orientation, willingness to engage in care.  Risk of self-harm chronically is also low, but could be further elevated in the event of treatment noncompliance and/or AODA.  Medications: Continue adderall ir 20mg po bid to target ADHD symptoms. Risks, benefits, side effects discussed with patient including elevated heart rate, elevated blood pressure, irritability, insomnia, sexual dysfunction, appetite suppressing properties, psychosis.  After discussion of these risks and benefits, the patient voiced understanding and agreed to proceed. Issa reviewed, UDS ordered, and controlled substance agreement signed & witnessed. Continue melatonin 5mg po qhs to target insomnia. Risks, benefits, side effects discussed with patient including sedation, dizziness/falls risk, GI upset.  After discussion of these risks and benefits, the patient voiced understanding and agreed to proceed.   Labs/studies: UDS 9/27/22 negative; UDS  Follow-up: 8 weeks    Patient screened positive for depression based on a PHQ-9 score of 3 on 9/14/2023. Follow-up recommendations include: Suicide Risk Assessment performed.     TREATMENT PLAN/GOALS: Continue supportive psychotherapy efforts and medications as indicated. Treatment and medication options discussed during today's visit. Patient ackowledged and verbally consented to continue with current treatment plan and was educated on the importance of  compliance with treatment and follow-up appointments.      MEDICATION ISSUES:  DERECK reviewed as expected.  Discussed medication options and treatment plan of prescribed medication as well as the risks, benefits, and side effects including potential falls, possible impaired driving and metabolic adversities among others. Patient is agreeable to call the office with any worsening of symptoms or onset of side effects. Patient is agreeable to call 911 or go to the nearest ER should he/she begin having SI/HI. No medication side effects or related complaints today.     MEDS ORDERED DURING VISIT:  New Medications Ordered This Visit   Medications    amphetamine-dextroamphetamine (ADDERALL) 20 MG tablet     Sig: Take 1 tablet by mouth 2 (Two) Times a Day.     Dispense:  60 tablet     Refill:  0       Return in about 8 weeks (around 11/9/2023) for Next scheduled follow up.         This document has been electronically signed by ANNIA Park  September 14, 2023 14:43 EDT      Part of this note may be an electronic transcription/translation of spoken language to printed text using the Dragon Dictation System.

## 2023-11-02 ENCOUNTER — LAB (OUTPATIENT)
Dept: LAB | Facility: HOSPITAL | Age: 18
End: 2023-11-02
Payer: COMMERCIAL

## 2023-11-02 ENCOUNTER — TELEMEDICINE (OUTPATIENT)
Dept: PSYCHIATRY | Facility: CLINIC | Age: 18
End: 2023-11-02
Payer: COMMERCIAL

## 2023-11-02 DIAGNOSIS — Z51.81 MEDICATION MONITORING ENCOUNTER: ICD-10-CM

## 2023-11-02 DIAGNOSIS — F51.05 INSOMNIA DUE TO MENTAL DISORDER: ICD-10-CM

## 2023-11-02 DIAGNOSIS — F90.0 ADHD (ATTENTION DEFICIT HYPERACTIVITY DISORDER), INATTENTIVE TYPE: Primary | ICD-10-CM

## 2023-11-02 LAB
AMPHET+METHAMPHET UR QL: POSITIVE
BARBITURATES UR QL SCN: NEGATIVE
BENZODIAZ UR QL SCN: NEGATIVE
CANNABINOIDS SERPL QL: NEGATIVE
COCAINE UR QL: NEGATIVE
FENTANYL UR-MCNC: NEGATIVE NG/ML
METHADONE UR QL SCN: NEGATIVE
OPIATES UR QL: NEGATIVE
OXYCODONE UR QL SCN: NEGATIVE

## 2023-11-02 PROCEDURE — 80307 DRUG TEST PRSMV CHEM ANLYZR: CPT

## 2023-11-02 RX ORDER — DEXTROAMPHETAMINE SACCHARATE, AMPHETAMINE ASPARTATE, DEXTROAMPHETAMINE SULFATE AND AMPHETAMINE SULFATE 5; 5; 5; 5 MG/1; MG/1; MG/1; MG/1
20 TABLET ORAL 2 TIMES DAILY
Qty: 60 TABLET | Refills: 0 | Status: SHIPPED | OUTPATIENT
Start: 2023-11-11

## 2023-11-02 NOTE — PROGRESS NOTES
"This provider is located at the [facility]through [facility]), [address], [facility], [address] using a secure [employer] Video Visit through [employer]. Patient is being seen remotely via telehealth at their home address in [place], and stated they are in a secure environment for this session. The patient's condition being diagnosed/treated is appropriate for telemedicine. The provider identified himself as well as his credentials.   The patient consent to be seen remotely, and when consent is given they understand that the consent allows for patient identifiable information to be sent to a third party as needed.   They may refuse to be seen remotely at any time. The electronic data is encrypted and password protected, and the patient  has been advised of the potential risks to privacy not withstanding such measures.    You have chosen to receive care through a telehealth visit.  Do you consent to use a video/audio connection for your medical care today? Yes    Patient identifiers utilized: Name and date of birth.    Patient verbally confirmed consent for today's encounter- yes    Subjective   Johnathan Fan is a 18 y.o. male who presents today for follow-up appointment.     Chief Complaint:  ADHD, insomnia    History of Present Illness:     ADHD: medication helping  \"Good\"  Able to accomplish tasks at work  Denies forgetting, losing things  Sleep disturbance: n  Low energy: n  Substance use: n  Medication compliant  Side effects: denies  Refills needed    Prior Psychiatric Medications:  Strattera, wellbutrin     The following portions of the patient's history were reviewed and updated as appropriate: allergies, current medications, past family history, past medical history, past social history, past surgical history and problem list.    Allergy:   No Known Allergies     Current Medications:   Current Outpatient Medications   Medication Sig Dispense Refill    [START ON " 11/11/2023] amphetamine-dextroamphetamine (ADDERALL) 20 MG tablet Take 1 tablet by mouth 2 (Two) Times a Day. 60 tablet 0     No current facility-administered medications for this visit.       Mental Status Exam:   Hygiene:   good  Cooperation:  Cooperative  Eye Contact:  Good  Psychomotor Behavior:  Appropriate  Affect:  Full range  Mood: normal  Hopelessness: Denies  Speech:  Normal  Thought Process:  Linear  Thought Content:  Normal  Suicidal:  None  Homicidal:  None  Hallucinations:  None  Delusion:  None  Memory:  Intact  Orientation:  Person, Place, Time, and Situation  Reliability:  good  Insight:  Good  Judgement:  Good  Impulse Control:  Good  Physical/Medical Issues:  No      Physical Exam:   There were no vitals taken for this visit. There is no height or weight on file to calculate BMI.   Due to the remote nature of this encounter (virtual encounter), vitals were unable to be obtained.  Weight change: n    PHQ-9 Depression Screening  Little interest or pleasure in doing things? (P) 0-->not at all   Feeling down, depressed, or hopeless? (P) 0-->not at all   Trouble falling or staying asleep, or sleeping too much? (P) 0-->not at all   Feeling tired or having little energy? (P) 0-->not at all   Poor appetite or overeating? (P) 0-->not at all   Feeling bad about yourself - or that you are a failure or have let yourself or your family down? (P) 0-->not at all   Trouble concentrating on things, such as reading the newspaper or watching television? (P) 0-->not at all   Moving or speaking so slowly that other people could have noticed? Or the opposite - being so fidgety or restless that you have been moving around a lot more than usual? (P) 0-->not at all   Thoughts that you would be better off dead, or of hurting yourself in some way? (P) 0-->not at all   PHQ-9 Total Score (P) 0   If you checked off any problems, how difficult have these problems made it for you to do your work, take care of things at home, or  get along with other people? (P) not difficult at all     PHQ-9 Total Score: (P) 0    Feeling nervous, anxious or on edge: (P) Not at all  Not being able to stop or control worrying: (P) Not at all  Worrying too much about different things: (P) Not at all  Trouble Relaxing: (P) Not at all  Being so restless that it is hard to sit still: (P) Not at all  Feeling afraid as if something awful might happen: (P) Not at all  Becoming easily annoyed or irritable: (P) Not at all  ASHLEY 7 Total Score: (P) 0  If you checked any problems, how difficult have these problems made it for you to do your work, take care of things at home, or get along with other people: (P) Not difficult at all    Previous available Provider notes and records reviewed by this APRN at today's encounter.     Visit Diagnoses:    ICD-10-CM ICD-9-CM   1. ADHD (attention deficit hyperactivity disorder), inattentive type  F90.0 314.00   2. Insomnia due to mental disorder  F51.05 300.9     327.02       TREATMENT PLAN: Continue supportive psychotherapy efforts and medications as indicated.  Medication and treatment options, both pharmacological and non-pharmacological treatment options, discussed during today's visit, including any off label use of medication. Patient acknowledged and verbally consented with current treatment plan and was educated on the importance of compliance with treatment and follow-up appointments.      - Continue adderall ir 20mg po bid to target ADHD symptoms  - Continue melatonin 5mg po qhs to target insomnia    Labs: UDS in process today  Therapy: Defers    MEDICATION ISSUES:  Discussed treatment plan and medication options of prescribed medication as well as the risks, benefits, any black box warnings, and side effects including potential falls, possible impaired driving, and metabolic adversities among others, including any off label use of medication. Patient is agreeable to call the office with any worsening of symptoms or onset  of side effects, or if any concerns or questions arise.  The contact information for the office is made available to the patient. Patient is agreeable to call 911 or go to the nearest ER should they begin having any SI/HI, or if any urgent concerns arise. No medication side effects or related complaints today. DERECK reviewed as expected.    RISK ASSESSMENT:  Risk of self-harm acutely is low.  Risk factors include and recent psychosocial stressors (pandemic). Protective factors include no family history, denies access to guns/weapons, no present SI, no history of suicide attempts or self-harm in the past, minimal AODA, healthcare seeking, future orientation, willingness to engage in care.  Risk of self-harm chronically is also low, but could be further elevated in the event of treatment noncompliance and/or AODA.    VERBAL INFORMED CONSENT FOR MEDICATION:  The patient was educated that their proposed/prescribed psychotropic medication(s) has potential risks, side effects, adverse effects, and black box warnings; and these have been discussed with the patient.  The patient has been informed that their treatment and medication dosage is to be individualized, and may even be above or below the recommended range/dosage due to patient individualization and response, but medication is prescribed using a shared decision making approach, and no medication or dosage will be prescribed without the patient's verbal consent.  The reason for the use of the medication including any off label use and alternative modes of treatment other than or in addition to medication has been considered and discussed, the probable consequences of not receiving the proposed treatment have been discussed, and any treatment side effects, black box warnings, and cautions associated with treatment have been discussed with the patient.  The patient is allowed ample time to openly discuss and ask questions regarding the proposed medication(s) and  treatment plan and the patient verbalizes understanding the reasons for the use of the medication, its potential risks and benefits, other alternative treatment(s), and the probable consequences that may occur if the proposed medication is not given.  The patient has been given ample time to ask questions and study the information and find the information to be specific, accurate, and complete.  The patient gives verbal consent for the medication(s) proposed/prescribed, they verbalized understanding that they can refuse and withdraw consent at any time with the assistance of this APRN, and the patient has verbally confirmed that they are aware, and are willing, to take the prescribed medication and follow the treatment plan with the known possible risks, side effect, black box warnings, and any potential medication interactions, and the patient reports they will be worse off without this medication and treatment plan.  The patient is advised to contact this APRN/this office if any questions or concerns arise at any time (at [phone]), or call 911/go to the closest emergency department if needed or outside of office hours.      [facility] No Show Policy:  We understand unexpected circumstances arise; however, anytime you miss your appointment we are unable to provide you appropriate care.  In addition, each appointment missed could have been used to provide care for others.  We ask that you call at least 24 hours in advance to cancel or reschedule an appointment.  We would like to take this opportunity to remind you of our policy stating patients who miss THREE or more appointments without cancelling or rescheduling 24 hours in advance of the appointment may be subject to cancellation of any further visits with our clinic and recommendation to seek in-person services/visits.    Please call [phone] to reschedule your appointment. If there are reasons that make it difficult for you to  keep the appointments, please call and let us know how we can help.  Please understand that medication prescribing will not continue without seeing your provider.      [facility]'s No Show Policy reviewed with patient at today's visit. Patient verbalized understanding of this policy. Discussed with patient that in the event that there are three or more no show visits, it will be recommended that they pursue in-person services/visits as noncompliance with telehealth visits indicates that patient is not an appropriate candidate for telemedicine and would likely be more appropriate for in-person services/visits. Patient verbalizes understanding and is agreeable to this.    MEDS ORDERED DURING VISIT:  New Medications Ordered This Visit   Medications    amphetamine-dextroamphetamine (ADDERALL) 20 MG tablet     Sig: Take 1 tablet by mouth 2 (Two) Times a Day.     Dispense:  60 tablet     Refill:  0       Return in about 8 weeks (around 12/28/2023) for Next scheduled follow up.         Progress toward goal: At goal    Functional Status: No impairment    Prognosis: Good with Ongoing Treatment     This document has been electronically signed by ANNIA Park  November 2, 2023 14:46 EDT      Please note that portions of this note were completed with a voice recognition program.

## 2023-12-22 DIAGNOSIS — F90.0 ADHD (ATTENTION DEFICIT HYPERACTIVITY DISORDER), INATTENTIVE TYPE: ICD-10-CM

## 2023-12-26 RX ORDER — DEXTROAMPHETAMINE SACCHARATE, AMPHETAMINE ASPARTATE, DEXTROAMPHETAMINE SULFATE AND AMPHETAMINE SULFATE 5; 5; 5; 5 MG/1; MG/1; MG/1; MG/1
20 TABLET ORAL 2 TIMES DAILY
Qty: 60 TABLET | Refills: 0 | Status: SHIPPED | OUTPATIENT
Start: 2023-12-26

## 2024-01-04 ENCOUNTER — TELEMEDICINE (OUTPATIENT)
Dept: PSYCHIATRY | Facility: CLINIC | Age: 19
End: 2024-01-04
Payer: COMMERCIAL

## 2024-01-04 DIAGNOSIS — F51.05 INSOMNIA DUE TO MENTAL DISORDER: ICD-10-CM

## 2024-01-04 DIAGNOSIS — F90.0 ADHD (ATTENTION DEFICIT HYPERACTIVITY DISORDER), INATTENTIVE TYPE: Primary | ICD-10-CM

## 2024-01-04 RX ORDER — DEXTROAMPHETAMINE SACCHARATE, AMPHETAMINE ASPARTATE MONOHYDRATE, DEXTROAMPHETAMINE SULFATE AND AMPHETAMINE SULFATE 7.5; 7.5; 7.5; 7.5 MG/1; MG/1; MG/1; MG/1
30 CAPSULE, EXTENDED RELEASE ORAL EVERY MORNING
Qty: 30 CAPSULE | Refills: 0 | Status: SHIPPED | OUTPATIENT
Start: 2024-01-04

## 2024-01-04 NOTE — PROGRESS NOTES
This provider is located at the [facility] (through [facility]), [address], [facility], [address] using a secure [employer] Video Visit through [employer]. Patient is being seen remotely via telehealth at their home address in [place], and stated they are in a secure environment for this session. The patient's condition being diagnosed/treated is appropriate for telemedicine. The provider identified himself as well as his credentials.   The patient consent to be seen remotely, and when consent is given they understand that the consent allows for patient identifiable information to be sent to a third party as needed.   They may refuse to be seen remotely at any time. The electronic data is encrypted and password protected, and the patient  has been advised of the potential risks to privacy not withstanding such measures.    You have chosen to receive care through a telehealth visit.  Do you consent to use a video/audio connection for your medical care today? Yes    Patient identifiers utilized: Name and date of birth.    Patient verbally confirmed consent for today's encounter- yes    Subjective   Johnathan Fan is a 18 y.o. male who presents today for follow-up appointment.     Chief Complaint:  ADHD, insomnia    History of Present Illness:     Patient reports he feels as though the adderall ir not working as well. Has had more difficulty with concentration. Feels as though the medication is wearing off earlier in the day. Difficulty completing tasks at work later in the day. Has been sleeping well.     Prior Psychiatric Medications:  Strattera, wellbutrin, adderall ir, adderall xr    The following portions of the patient's history were reviewed and updated as appropriate: allergies, current medications, past family history, past medical history, past social history, past surgical history and problem list.    Allergy:   No Known Allergies     Current Medications:    Current Outpatient Medications   Medication Sig Dispense Refill    amphetamine-dextroamphetamine XR (Adderall XR) 30 MG 24 hr capsule Take 1 capsule by mouth Every Morning 30 capsule 0     No current facility-administered medications for this visit.       Mental Status Exam:   Hygiene:   good  Cooperation:  Cooperative  Eye Contact:  Good  Psychomotor Behavior:  Appropriate  Affect:  Full range  Mood: normal  Hopelessness: Denies  Speech:  Normal  Thought Process:  Goal directed  Thought Content:  Normal  Suicidal:  None  Homicidal:  None  Hallucinations:  None  Delusion:  None  Memory:  Intact  Orientation:  Grossly intact  Reliability:  good  Insight:  Good  Judgement:  Good  Impulse Control:  Good  Physical/Medical Issues:  No      Physical Exam:   There were no vitals taken for this visit. There is no height or weight on file to calculate BMI.   Due to the remote nature of this encounter (virtual encounter), vitals were unable to be obtained.  Weight change: n    PHQ-9 Depression Screening  Little interest or pleasure in doing things? (P) 1-->several days   Feeling down, depressed, or hopeless? (P) 1-->several days   Trouble falling or staying asleep, or sleeping too much? (P) 0-->not at all   Feeling tired or having little energy? (P) 1-->several days   Poor appetite or overeating? (P) 0-->not at all   Feeling bad about yourself - or that you are a failure or have let yourself or your family down? (P) 0-->not at all   Trouble concentrating on things, such as reading the newspaper or watching television? (P) 1-->several days   Moving or speaking so slowly that other people could have noticed? Or the opposite - being so fidgety or restless that you have been moving around a lot more than usual? (P) 0-->not at all   Thoughts that you would be better off dead, or of hurting yourself in some way? (P) 0-->not at all   PHQ-9 Total Score (P) 4   If you checked off any problems, how difficult have these problems made  it for you to do your work, take care of things at home, or get along with other people? (P) somewhat difficult     PHQ-9 Total Score: (P) 4    Feeling nervous, anxious or on edge: (P) More than half the days  Not being able to stop or control worrying: (P) Several days  Worrying too much about different things: (P) Several days  Trouble Relaxing: (P) Several days  Being so restless that it is hard to sit still: (P) Not at all  Feeling afraid as if something awful might happen: (P) Not at all  Becoming easily annoyed or irritable: (P) Not at all  ASHLEY 7 Total Score: (P) 5  If you checked any problems, how difficult have these problems made it for you to do your work, take care of things at home, or get along with other people: (P) Somewhat difficult    Previous available Provider notes and records reviewed by this APRN at today's encounter.     Visit Diagnoses:    ICD-10-CM ICD-9-CM   1. ADHD (attention deficit hyperactivity disorder), inattentive type  F90.0 314.00   2. Insomnia due to mental disorder  F51.05 300.9     327.02       TREATMENT PLAN: Continue supportive psychotherapy efforts and medications.  Medication and treatment options, both pharmacological and non-pharmacological treatment options, discussed during today's visit, including any off label use of medication. Patient acknowledged and verbally consented with current treatment plan and was educated on the importance of compliance with treatment and follow-up appointments.      - Stop adderall. Start adderall xr 30mg po qday and adderall ir 10mg po at lunch to target ADHD  - Continue melatonin 5mg po qhs to target insomnia    Labs: UDS November 2023 positive for amphetamines (rx)  Therapy: Defers    MEDICATION ISSUES:  Discussed treatment plan and medication options of prescribed medication as well as the risks, benefits, any black box warnings, and side effects including potential falls, possible impaired driving, and metabolic adversities among  others, including any off label use of medication. Patient is agreeable to call the office with any worsening of symptoms or onset of side effects, or if any concerns or questions arise.  The contact information for the office is made available to the patient. Patient is agreeable to call 911 or go to the nearest ER should they begin having any SI/HI, or if any urgent concerns arise. No medication side effects or related complaints today. DERECK reviewed as expected.    RISK ASSESSMENT:  Risk of self-harm acutely is low.  Risk factors include and recent psychosocial stressors (pandemic). Protective factors include no family history, denies access to guns/weapons, no present SI, no history of suicide attempts or self-harm in the past, minimal AODA, healthcare seeking, future orientation, willingness to engage in care.  Risk of self-harm chronically is also low, but could be further elevated in the event of treatment noncompliance and/or AODA.    VERBAL INFORMED CONSENT FOR MEDICATION:  The patient was educated that their proposed/prescribed psychotropic medication(s) has potential risks, side effects, adverse effects, and black box warnings; and these have been discussed with the patient.  The patient has been informed that their treatment and medication dosage is to be individualized, and may even be above or below the recommended range/dosage due to patient individualization and response, but medication is prescribed using a shared decision making approach, and no medication or dosage will be prescribed without the patient's verbal consent.  The reason for the use of the medication including any off label use and alternative modes of treatment other than or in addition to medication has been considered and discussed, the probable consequences of not receiving the proposed treatment have been discussed, and any treatment side effects, black box warnings, and cautions associated with treatment have been discussed  with the patient.  The patient is allowed ample time to openly discuss and ask questions regarding the proposed medication(s) and treatment plan and the patient verbalizes understanding the reasons for the use of the medication, its potential risks and benefits, other alternative treatment(s), and the probable consequences that may occur if the proposed medication is not given.  The patient has been given ample time to ask questions and study the information and find the information to be specific, accurate, and complete.  The patient gives verbal consent for the medication(s) proposed/prescribed, they verbalized understanding that they can refuse and withdraw consent at any time with the assistance of this APRN, and the patient has verbally confirmed that they are aware, and are willing, to take the prescribed medication and follow the treatment plan with the known possible risks, side effect, black box warnings, and any potential medication interactions, and the patient reports they will be worse off without this medication and treatment plan.  The patient is advised to contact this APRN/this office if any questions or concerns arise at any time (at [phone]), or call 911/go to the closest emergency department if needed or outside of office hours.      [facility] No Show Policy:  We understand unexpected circumstances arise; however, anytime you miss your appointment we are unable to provide you appropriate care.  In addition, each appointment missed could have been used to provide care for others.  We ask that you call at least 24 hours in advance to cancel or reschedule an appointment.  We would like to take this opportunity to remind you of our policy stating patients who miss THREE or more appointments without cancelling or rescheduling 24 hours in advance of the appointment may be subject to cancellation of any further visits with our clinic and recommendation to seek in-person  services/visits.    Please call [phone] to reschedule your appointment. If there are reasons that make it difficult for you to keep the appointments, please call and let us know how we can help.  Please understand that medication prescribing will not continue without seeing your provider.      [facility]'s No Show Policy reviewed with patient at today's visit. Patient verbalized understanding of this policy. Discussed with patient that in the event that there are three or more no show visits, it will be recommended that they pursue in-person services/visits as noncompliance with telehealth visits indicates that patient is not an appropriate candidate for telemedicine and would likely be more appropriate for in-person services/visits. Patient verbalizes understanding and is agreeable to this.    MEDS ORDERED DURING VISIT:  New Medications Ordered This Visit   Medications    amphetamine-dextroamphetamine XR (Adderall XR) 30 MG 24 hr capsule     Sig: Take 1 capsule by mouth Every Morning     Dispense:  30 capsule     Refill:  0     Adderall 20mg po bid rx discontinued.       Return in about 4 weeks (around 2/1/2024) for Next scheduled follow up.         Progress toward goal: Not at goal    Functional Status: No impairment    Prognosis: Good with Ongoing Treatment     This document has been electronically signed by ANNIA Park  January 4, 2024 15:02 EST      Please note that portions of this note were completed with a voice recognition program.

## 2024-02-01 ENCOUNTER — TELEMEDICINE (OUTPATIENT)
Dept: PSYCHIATRY | Facility: CLINIC | Age: 19
End: 2024-02-01
Payer: COMMERCIAL

## 2024-02-01 DIAGNOSIS — F51.05 INSOMNIA DUE TO MENTAL DISORDER: ICD-10-CM

## 2024-02-01 DIAGNOSIS — F90.0 ADHD (ATTENTION DEFICIT HYPERACTIVITY DISORDER), INATTENTIVE TYPE: Primary | ICD-10-CM

## 2024-02-01 RX ORDER — DEXTROAMPHETAMINE SACCHARATE, AMPHETAMINE ASPARTATE, DEXTROAMPHETAMINE SULFATE AND AMPHETAMINE SULFATE 2.5; 2.5; 2.5; 2.5 MG/1; MG/1; MG/1; MG/1
10 TABLET ORAL DAILY
Qty: 30 TABLET | Refills: 0 | Status: SHIPPED | OUTPATIENT
Start: 2024-02-01

## 2024-02-01 RX ORDER — DEXTROAMPHETAMINE SACCHARATE, AMPHETAMINE ASPARTATE MONOHYDRATE, DEXTROAMPHETAMINE SULFATE AND AMPHETAMINE SULFATE 7.5; 7.5; 7.5; 7.5 MG/1; MG/1; MG/1; MG/1
30 CAPSULE, EXTENDED RELEASE ORAL EVERY MORNING
Qty: 30 CAPSULE | Refills: 0 | Status: SHIPPED | OUTPATIENT
Start: 2024-02-06

## 2024-02-01 NOTE — PROGRESS NOTES
This provider is located at the [facility] (through [facility]), [address], [facility], [address] using a secure [employer] Video Visit through [employer]. Patient is being seen remotely via telehealth at their home address in [place], and stated they are in a secure environment for this session. The patient's condition being diagnosed/treated is appropriate for telemedicine. The provider identified himself as well as his credentials.   The patient consent to be seen remotely, and when consent is given they understand that the consent allows for patient identifiable information to be sent to a third party as needed.   They may refuse to be seen remotely at any time. The electronic data is encrypted and password protected, and the patient  has been advised of the potential risks to privacy not withstanding such measures.    You have chosen to receive care through a telehealth visit.  Do you consent to use a video/audio connection for your medical care today? Yes    Patient identifiers utilized: Name and date of birth.    Patient verbally confirmed consent for today's encounter- yes    Subjective   Johnathan Fan is a 18 y.o. male who presents today for follow-up appointment.     Chief Complaint:  ADHD, Insomnia    History of Present Illness:     Feels as though the change to adderall xr has helped concentration more. Feels as though his concentration is lasting most of the work day. Able to complete tasks effectively at work. Has been sleeping well. Tolerating medication well with no side effects.       Prior Psychiatric Medications:  Strattera, wellbutrin, adderall ir, adderall xr     The following portions of the patient's history were reviewed and updated as appropriate: allergies, current medications, past family history, past medical history, past social history, past surgical history and problem list.    Allergy:   No Known Allergies     Current Medications:    Current Outpatient Medications   Medication Sig Dispense Refill    [START ON 2/6/2024] amphetamine-dextroamphetamine XR (Adderall XR) 30 MG 24 hr capsule Take 1 capsule by mouth Every Morning 30 capsule 0    amphetamine-dextroamphetamine (Adderall) 10 MG tablet Take 1 tablet by mouth Daily. 30 tablet 0     No current facility-administered medications for this visit.       Mental Status Exam:   Hygiene:   good  Cooperation:  Cooperative  Eye Contact:  Good  Psychomotor Behavior:  Appropriate  Affect:  Full range  Mood: normal  Hopelessness: Denies  Speech:  Normal  Thought Process:  Goal directed  Thought Content:  Normal  Suicidal:  None  Homicidal:  None  Hallucinations:  None  Delusion:  None  Memory:  Intact  Orientation:  Grossly intact  Reliability:  good  Insight:  Good  Judgement:  Good  Impulse Control:  Good  Physical/Medical Issues:  No      Physical Exam:   There were no vitals taken for this visit. There is no height or weight on file to calculate BMI.   Due to the remote nature of this encounter (virtual encounter), vitals were unable to be obtained.  Weight change: n    PHQ-9 Depression Screening  Little interest or pleasure in doing things? (P) 0-->not at all   Feeling down, depressed, or hopeless? (P) 0-->not at all   Trouble falling or staying asleep, or sleeping too much? (P) 0-->not at all   Feeling tired or having little energy? (P) 0-->not at all   Poor appetite or overeating? (P) 0-->not at all   Feeling bad about yourself - or that you are a failure or have let yourself or your family down? (P) 0-->not at all   Trouble concentrating on things, such as reading the newspaper or watching television? (P) 0-->not at all   Moving or speaking so slowly that other people could have noticed? Or the opposite - being so fidgety or restless that you have been moving around a lot more than usual? (P) 0-->not at all   Thoughts that you would be better off dead, or of hurting yourself in some way? (P)  0-->not at all   PHQ-9 Total Score (P) 0   If you checked off any problems, how difficult have these problems made it for you to do your work, take care of things at home, or get along with other people?       PHQ-9 Total Score: (P) 0    Feeling nervous, anxious or on edge: (P) Several days  Not being able to stop or control worrying: (P) Not at all  Worrying too much about different things: (P) Not at all  Trouble Relaxing: (P) Not at all  Being so restless that it is hard to sit still: (P) Not at all  Feeling afraid as if something awful might happen: (P) Not at all  Becoming easily annoyed or irritable: (P) Not at all  ASHLEY 7 Total Score: (P) 1    Previous available Provider notes and records reviewed by this APRN at today's encounter.     Visit Diagnoses:    ICD-10-CM ICD-9-CM   1. ADHD (attention deficit hyperactivity disorder), inattentive type  F90.0 314.00   2. Insomnia due to mental disorder  F51.05 300.9     327.02       TREATMENT PLAN: Continue supportive psychotherapy efforts and medications.  Medication and treatment options, both pharmacological and non-pharmacological treatment options, discussed during today's visit, including any off label use of medication. Patient acknowledged and verbally consented with current treatment plan and was educated on the importance of compliance with treatment and follow-up appointments.      - Continue adderall xr 30mg po qday and adderall 10mg ir po booster at noon to target ADHD  - Continue melatonin 5mg po qhs to target insomnia    Labs: None ordered at this time  Therapy: Defers    MEDICATION ISSUES:  Discussed treatment plan and medication options of prescribed medication as well as the risks, benefits, any black box warnings, and side effects including potential falls, possible impaired driving, and metabolic adversities among others, including any off label use of medication. Patient is agreeable to call the office with any worsening of symptoms or onset of  side effects, or if any concerns or questions arise.  The contact information for the office is made available to the patient. Patient is agreeable to call 911 or go to the nearest ER should they begin having any SI/HI, or if any urgent concerns arise. No medication side effects or related complaints today. DERECK reviewed as expected.    RISK ASSESSMENT:  Risk of self-harm acutely is low.  Risk factors include recent psychosocial stressors (pandemic). Protective factors include no family history, denies access to guns/weapons, no present SI, no history of suicide attempts or self-harm in the past, minimal AODA, healthcare seeking, future orientation, willingness to engage in care.  Risk of self-harm chronically is also low, but could be further elevated in the event of treatment noncompliance and/or AODA.    VERBAL INFORMED CONSENT FOR MEDICATION:  The patient was educated that their proposed/prescribed psychotropic medication(s) has potential risks, side effects, adverse effects, and black box warnings; and these have been discussed with the patient.  The patient has been informed that their treatment and medication dosage is to be individualized, and may even be above or below the recommended range/dosage due to patient individualization and response, but medication is prescribed using a shared decision making approach, and no medication or dosage will be prescribed without the patient's verbal consent.  The reason for the use of the medication including any off label use and alternative modes of treatment other than or in addition to medication has been considered and discussed, the probable consequences of not receiving the proposed treatment have been discussed, and any treatment side effects, black box warnings, and cautions associated with treatment have been discussed with the patient.  The patient is allowed ample time to openly discuss and ask questions regarding the proposed medication(s) and treatment  plan and the patient verbalizes understanding the reasons for the use of the medication, its potential risks and benefits, other alternative treatment(s), and the probable consequences that may occur if the proposed medication is not given.  The patient has been given ample time to ask questions and study the information and find the information to be specific, accurate, and complete.  The patient gives verbal consent for the medication(s) proposed/prescribed, they verbalized understanding that they can refuse and withdraw consent at any time with the assistance of this APRN, and the patient has verbally confirmed that they are aware, and are willing, to take the prescribed medication and follow the treatment plan with the known possible risks, side effect, black box warnings, and any potential medication interactions, and the patient reports they will be worse off without this medication and treatment plan.  The patient is advised to contact this APRN/this office if any questions or concerns arise at any time (at [phone]), or call 911/go to the closest emergency department if needed or outside of office hours.      [facility] No Show Policy:  We understand unexpected circumstances arise; however, anytime you miss your appointment we are unable to provide you appropriate care.  In addition, each appointment missed could have been used to provide care for others.  We ask that you call at least 24 hours in advance to cancel or reschedule an appointment.  We would like to take this opportunity to remind you of our policy stating patients who miss THREE or more appointments without cancelling or rescheduling 24 hours in advance of the appointment may be subject to cancellation of any further visits with our clinic and recommendation to seek in-person services/visits.    Please call [phone] to reschedule your appointment. If there are reasons that make it difficult for you to keep the  appointments, please call and let us know how we can help.  Please understand that medication prescribing will not continue without seeing your provider.      [facility]'s No Show Policy reviewed with patient at today's visit. Patient verbalized understanding of this policy. Discussed with patient that in the event that there are three or more no show visits, it will be recommended that they pursue in-person services/visits as noncompliance with telehealth visits indicates that patient is not an appropriate candidate for telemedicine and would likely be more appropriate for in-person services/visits. Patient verbalizes understanding and is agreeable to this.    MEDS ORDERED DURING VISIT:  New Medications Ordered This Visit   Medications    amphetamine-dextroamphetamine XR (Adderall XR) 30 MG 24 hr capsule     Sig: Take 1 capsule by mouth Every Morning     Dispense:  30 capsule     Refill:  0    amphetamine-dextroamphetamine (Adderall) 10 MG tablet     Sig: Take 1 tablet by mouth Daily.     Dispense:  30 tablet     Refill:  0     Booster       Return in about 9 weeks (around 4/4/2024) for Next scheduled follow up.         Progress toward goal: At goal    Functional Status: No impairment    Prognosis: Good with Ongoing Treatment     This document has been electronically signed by ANNIA Park  February 1, 2024 14:29 EST      Please note that portions of this note were completed with a voice recognition program.

## 2024-02-19 ENCOUNTER — PRIOR AUTHORIZATION (OUTPATIENT)
Dept: PSYCHIATRY | Facility: CLINIC | Age: 19
End: 2024-02-19
Payer: COMMERCIAL

## 2024-03-18 DIAGNOSIS — F90.0 ADHD (ATTENTION DEFICIT HYPERACTIVITY DISORDER), INATTENTIVE TYPE: ICD-10-CM

## 2024-03-18 RX ORDER — DEXTROAMPHETAMINE SACCHARATE, AMPHETAMINE ASPARTATE, DEXTROAMPHETAMINE SULFATE AND AMPHETAMINE SULFATE 2.5; 2.5; 2.5; 2.5 MG/1; MG/1; MG/1; MG/1
10 TABLET ORAL DAILY
Qty: 30 TABLET | Refills: 0 | Status: SHIPPED | OUTPATIENT
Start: 2024-03-18

## 2024-03-18 RX ORDER — DEXTROAMPHETAMINE SACCHARATE, AMPHETAMINE ASPARTATE MONOHYDRATE, DEXTROAMPHETAMINE SULFATE AND AMPHETAMINE SULFATE 7.5; 7.5; 7.5; 7.5 MG/1; MG/1; MG/1; MG/1
30 CAPSULE, EXTENDED RELEASE ORAL EVERY MORNING
Qty: 30 CAPSULE | Refills: 0 | Status: SHIPPED | OUTPATIENT
Start: 2024-03-18

## 2024-04-01 ENCOUNTER — TELEMEDICINE (OUTPATIENT)
Dept: PSYCHIATRY | Facility: CLINIC | Age: 19
End: 2024-04-01
Payer: COMMERCIAL

## 2024-04-01 DIAGNOSIS — F90.0 ADHD (ATTENTION DEFICIT HYPERACTIVITY DISORDER), INATTENTIVE TYPE: Primary | ICD-10-CM

## 2024-04-01 DIAGNOSIS — F51.05 INSOMNIA DUE TO MENTAL DISORDER: ICD-10-CM

## 2024-04-01 RX ORDER — DEXTROAMPHETAMINE SACCHARATE, AMPHETAMINE ASPARTATE, DEXTROAMPHETAMINE SULFATE AND AMPHETAMINE SULFATE 5; 5; 5; 5 MG/1; MG/1; MG/1; MG/1
20 TABLET ORAL 2 TIMES DAILY
Qty: 60 TABLET | Refills: 0 | Status: SHIPPED | OUTPATIENT
Start: 2024-04-01

## 2024-04-01 NOTE — PROGRESS NOTES
This provider is located at the [facility] (through [facility]), [address], [place], [address] using a secure [employer] Video Visit through [employer]. Patient is being seen remotely via telehealth at their home address in [place], and stated they are in a secure environment for this session. The patient's condition being diagnosed/treated is appropriate for telemedicine. The provider identified himself as well as his credentials.   The patient consent to be seen remotely, and when consent is given they understand that the consent allows for patient identifiable information to be sent to a third party as needed.   They may refuse to be seen remotely at any time. The electronic data is encrypted and password protected, and the patient  has been advised of the potential risks to privacy not withstanding such measures.    You have chosen to receive care through a telehealth visit.  Do you consent to use a video/audio connection for your medical care today? Yes    Patient identifiers utilized: Name and date of birth.    Patient verbally confirmed consent for today's encounter- yes    Subjective   Johnathan Fan is a 18 y.o. male who presents today for follow-up appointment.     Chief Complaint:  ADHD, Insomnia    History of Present Illness:     Adhd- feels as though adderall is helping concentration improve. Able to complete tasks effectively at work and home. Sleeping well with melatonin as needed.     Prior Psychiatric Medications:  Strattera, wellbutrin, adderall ir, adderall xr     The following portions of the patient's history were reviewed and updated as appropriate: allergies, current medications, past family history, past medical history, past social history, past surgical history and problem list.    Allergy:   No Known Allergies     Current Medications:   Current Outpatient Medications   Medication Sig Dispense Refill    amphetamine-dextroamphetamine (Adderall)  20 MG tablet Take 1 tablet by mouth 2 (Two) Times a Day. 60 tablet 0     No current facility-administered medications for this visit.       Mental Status Exam:   Hygiene:   good  Cooperation:  Cooperative  Eye Contact:  Good  Psychomotor Behavior:  Appropriate  Affect:  Full range  Mood: normal  Hopelessness: Denies  Speech:  Normal  Thought Process:  Goal directed  Thought Content:  Normal  Suicidal:  None  Homicidal:  None  Hallucinations:  None  Delusion:  None  Memory:  Intact  Orientation:  Grossly intact  Reliability:  good  Insight:  Good  Judgement:  Good  Impulse Control:  Good  Physical/Medical Issues:  No      Physical Exam:   There were no vitals taken for this visit. There is no height or weight on file to calculate BMI.   Due to the remote nature of this encounter (virtual encounter), vitals were unable to be obtained.  Weight change: n    PHQ-9 Depression Screening  Little interest or pleasure in doing things? (P) 0-->not at all   Feeling down, depressed, or hopeless? (P) 0-->not at all   Trouble falling or staying asleep, or sleeping too much? (P) 0-->not at all   Feeling tired or having little energy? (P) 0-->not at all   Poor appetite or overeating? (P) 0-->not at all   Feeling bad about yourself - or that you are a failure or have let yourself or your family down? (P) 0-->not at all   Trouble concentrating on things, such as reading the newspaper or watching television? (P) 0-->not at all   Moving or speaking so slowly that other people could have noticed? Or the opposite - being so fidgety or restless that you have been moving around a lot more than usual? (P) 0-->not at all   Thoughts that you would be better off dead, or of hurting yourself in some way? (P) 0-->not at all   PHQ-9 Total Score (P) 0   If you checked off any problems, how difficult have these problems made it for you to do your work, take care of things at home, or get along with other people? (P) not difficult at all     PHQ-9  Total Score: (P) 0    Feeling nervous, anxious or on edge: (P) More than half the days  Not being able to stop or control worrying: (P) Not at all  Worrying too much about different things: (P) Several days  Trouble Relaxing: (P) Several days  Being so restless that it is hard to sit still: (P) Not at all  Feeling afraid as if something awful might happen: (P) Not at all  Becoming easily annoyed or irritable: (P) Not at all  ASHLEY 7 Total Score: (P) 4  If you checked any problems, how difficult have these problems made it for you to do your work, take care of things at home, or get along with other people: (P) Not difficult at all    Previous available Provider notes and records reviewed by this APRN at today's encounter.     Visit Diagnoses:    ICD-10-CM ICD-9-CM   1. ADHD (attention deficit hyperactivity disorder), inattentive type  F90.0 314.00   2. Insomnia due to mental disorder  F51.05 300.9     327.02       TREATMENT PLAN: Continue supportive psychotherapy efforts and medications.  Medication and treatment options, both pharmacological and non-pharmacological treatment options, discussed during today's visit, including any off label use of medication. Patient acknowledged and verbally consented with current treatment plan and was educated on the importance of compliance with treatment and follow-up appointments.      - Has been unable to pickup adderall xr due to shortage. Switch adderall xr 30mg po qday and adderall ir 10mg po qday to adderall ir 20mg po bid to target ADHD  - continue melatonin 5mg po qhs prn to target insomnia    Labs: UDS November 2023  Therapy: Defers    MEDICATION ISSUES:  Discussed treatment plan and medication options of prescribed medication as well as the risks, benefits, any black box warnings, and side effects including potential falls, possible impaired driving, and metabolic adversities among others, including any off label use of medication. Patient is agreeable to call the office  with any worsening of symptoms or onset of side effects, or if any concerns or questions arise.  The contact information for the office is made available to the patient. Patient is agreeable to call 911 or go to the nearest ER should they begin having any SI/HI, or if any urgent concerns arise. No medication side effects or related complaints today. DEERCK reviewed as expected.    RISK ASSESSMENT:  Risk of self-harm acutely is low.  Risk factors include recent psychosocial stressors (pandemic). Protective factors include no family history, denies access to guns/weapons, no present SI, no history of suicide attempts or self-harm in the past, minimal AODA, healthcare seeking, future orientation, willingness to engage in care.  Risk of self-harm chronically is also low, but could be further elevated in the event of treatment noncompliance and/or AODA.    VERBAL INFORMED CONSENT FOR MEDICATION:  The patient was educated that their proposed/prescribed psychotropic medication(s) has potential risks, side effects, adverse effects, and black box warnings; and these have been discussed with the patient.  The patient has been informed that their treatment and medication dosage is to be individualized, and may even be above or below the recommended range/dosage due to patient individualization and response, but medication is prescribed using a shared decision making approach, and no medication or dosage will be prescribed without the patient's verbal consent.  The reason for the use of the medication including any off label use and alternative modes of treatment other than or in addition to medication has been considered and discussed, the probable consequences of not receiving the proposed treatment have been discussed, and any treatment side effects, black box warnings, and cautions associated with treatment have been discussed with the patient.  The patient is allowed ample time to openly discuss and ask questions regarding  the proposed medication(s) and treatment plan and the patient verbalizes understanding the reasons for the use of the medication, its potential risks and benefits, other alternative treatment(s), and the probable consequences that may occur if the proposed medication is not given.  The patient has been given ample time to ask questions and study the information and find the information to be specific, accurate, and complete.  The patient gives verbal consent for the medication(s) proposed/prescribed, they verbalized understanding that they can refuse and withdraw consent at any time with the assistance of this APRN, and the patient has verbally confirmed that they are aware, and are willing, to take the prescribed medication and follow the treatment plan with the known possible risks, side effect, black box warnings, and any potential medication interactions, and the patient reports they will be worse off without this medication and treatment plan.  The patient is advised to contact this APRN/this office if any questions or concerns arise at any time (at [phone]), or call 911/go to the closest emergency department if needed or outside of office hours.      [facility] No Show Policy:  We understand unexpected circumstances arise; however, anytime you miss your appointment we are unable to provide you appropriate care.  In addition, each appointment missed could have been used to provide care for others.  We ask that you call at least 24 hours in advance to cancel or reschedule an appointment.  We would like to take this opportunity to remind you of our policy stating patients who miss THREE or more appointments without cancelling or rescheduling 24 hours in advance of the appointment may be subject to cancellation of any further visits with our clinic and recommendation to seek in-person services/visits.    Please call [phone] to reschedule your appointment. If there are reasons that  make it difficult for you to keep the appointments, please call and let us know how we can help.  Please understand that medication prescribing will not continue without seeing your provider.      [facility]'s No Show Policy reviewed with patient at today's visit. Patient verbalized understanding of this policy. Discussed with patient that in the event that there are three or more no show visits, it will be recommended that they pursue in-person services/visits as noncompliance with telehealth visits indicates that patient is not an appropriate candidate for telemedicine and would likely be more appropriate for in-person services/visits. Patient verbalizes understanding and is agreeable to this.    MEDS ORDERED DURING VISIT:  New Medications Ordered This Visit   Medications    amphetamine-dextroamphetamine (Adderall) 20 MG tablet     Sig: Take 1 tablet by mouth 2 (Two) Times a Day.     Dispense:  60 tablet     Refill:  0       Return in about 8 weeks (around 5/27/2024) for Next scheduled follow up.         Progress toward goal: At goal    Functional Status: No impairment    Prognosis: Good with Ongoing Treatment     This document has been electronically signed by ANNIA Park  April 1, 2024 14:29 EDT      Please note that portions of this note were completed with a voice recognition program.

## 2024-05-04 DIAGNOSIS — F90.0 ADHD (ATTENTION DEFICIT HYPERACTIVITY DISORDER), INATTENTIVE TYPE: ICD-10-CM

## 2024-05-06 RX ORDER — DEXTROAMPHETAMINE SACCHARATE, AMPHETAMINE ASPARTATE, DEXTROAMPHETAMINE SULFATE AND AMPHETAMINE SULFATE 5; 5; 5; 5 MG/1; MG/1; MG/1; MG/1
20 TABLET ORAL 2 TIMES DAILY
Qty: 60 TABLET | Refills: 0 | Status: SHIPPED | OUTPATIENT
Start: 2024-05-06

## 2024-06-03 DIAGNOSIS — F90.0 ADHD (ATTENTION DEFICIT HYPERACTIVITY DISORDER), INATTENTIVE TYPE: ICD-10-CM

## 2024-06-03 DIAGNOSIS — Z51.81 MEDICATION MONITORING ENCOUNTER: Primary | ICD-10-CM

## 2024-06-03 RX ORDER — DEXTROAMPHETAMINE SACCHARATE, AMPHETAMINE ASPARTATE, DEXTROAMPHETAMINE SULFATE AND AMPHETAMINE SULFATE 5; 5; 5; 5 MG/1; MG/1; MG/1; MG/1
20 TABLET ORAL 2 TIMES DAILY
Qty: 60 TABLET | Refills: 0 | Status: SHIPPED | OUTPATIENT
Start: 2024-06-04

## 2024-07-11 ENCOUNTER — TELEMEDICINE (OUTPATIENT)
Dept: PSYCHIATRY | Facility: CLINIC | Age: 19
End: 2024-07-11
Payer: COMMERCIAL

## 2024-07-11 DIAGNOSIS — Z79.899 ENCOUNTER FOR LONG-TERM (CURRENT) USE OF OTHER MEDICATIONS: ICD-10-CM

## 2024-07-11 DIAGNOSIS — Z51.81 MEDICATION MONITORING ENCOUNTER: ICD-10-CM

## 2024-07-11 DIAGNOSIS — F90.0 ADHD (ATTENTION DEFICIT HYPERACTIVITY DISORDER), INATTENTIVE TYPE: Primary | ICD-10-CM

## 2024-07-11 DIAGNOSIS — F41.9 ANXIETY DISORDER, UNSPECIFIED TYPE: ICD-10-CM

## 2024-07-11 RX ORDER — DEXTROAMPHETAMINE SACCHARATE, AMPHETAMINE ASPARTATE, DEXTROAMPHETAMINE SULFATE AND AMPHETAMINE SULFATE 5; 5; 5; 5 MG/1; MG/1; MG/1; MG/1
20 TABLET ORAL 2 TIMES DAILY
Qty: 60 TABLET | Refills: 0 | Status: SHIPPED | OUTPATIENT
Start: 2024-07-11

## 2024-07-11 NOTE — PATIENT INSTRUCTIONS
For concerns or refills call the [facility] at [phone]  Urine drug screen ordered, go to any [facility] lab  Call 1 week prior to needing refills on Adderall  Stay well-hydrated

## 2024-07-11 NOTE — PROGRESS NOTES
"This provider is located at [facility], [address] [place] [address], [facility], [id number] using a secure [employer] Video Visit through [employer]. Patient is being seen remotely via telehealth at their home address in [place], and stated they are in a secure environment for this session. The patient's condition being diagnosed/treated is appropriate for telemedicine. The provider identified herself as well as her credentials.   The patient, and/or patients guardian, consent to be seen remotely, and when consent is given they understand that the consent allows for patient identifiable information to be sent to a third party as needed.   They may refuse to be seen remotely at any time. The electronic data is encrypted and password protected, and the patient and/or guardian has been advised of the potential risks to privacy not withstanding such measures.   PT Identifiers used: Name and .    You have chosen to receive care through a telehealth visit.  Do you consent to use a video/audio connection for your medical care today? Yes      Subjective   Johnathan Fan is a 19 y.o. male who presents today for  transfer of care    Chief Complaint:  \"ADHD\"    History of Present Illness:     History of Present Illness  Patient reported above chief complaint.  Previously saw Mr. Colorado, initial encounter with Mr. Colorado was at the [place] [facility].  Patient reports he had problems with attention deficit in high school, he never had treatment for it.  He has also had some anxiety and tried Wellbutrin but only took it for a couple weeks but it did not help.  He reports his anxiety is primarily around his performance at work.  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  He does report sleep is good and appetite overall is good although sometimes he overeats a little bit.  He is in college and has 1 semester left looking for a business management degree.  Currently " lives with his parents he has an older brother who lives in [place] everyone gets along well.  Patient has a couple of dogs for pets.  For [place] boat.  ADHD symptoms consist primarily of inattentive to details, difficulty with organization, difficulty with task completion, task initiation, poor focus, easily distracted.  He reports medication has been beneficial to help with the symptoms.  PHQ-9 today scored at 7, ASHLEY-7 scored at 7.  Patient endorsed having mid day fatigue, wonders if his testosterone could be low.  He was instructed to follow-up with primary care for any lab testing.  We also discussed good diet habits, staying hydrated.          Patient Health Questionnaire-9 (PHQ-9) (Depression Screening Tool)  Little interest or pleasure in doing things? (P) 2-->more than half the days   Feeling down, depressed, or hopeless? (P) 1-->several days   Trouble falling or staying asleep, or sleeping too much? (P) 0-->not at all   Feeling tired or having little energy? (P) 2-->more than half the days   Poor appetite or overeating? (P) 1-->several days   Feeling bad about yourself - or that you are a failure or have let yourself or your family down? (P) 0-->not at all   Trouble concentrating on things, such as reading the newspaper or watching television? (P) 1-->several days   Moving or speaking so slowly that other people could have noticed? Or the opposite - being so fidgety or restless that you have been moving around a lot more than usual? (P) 0-->not at all   Thoughts that you would be better off dead, or of hurting yourself in some way? (P) 0-->not at all   PHQ-9 Total Score (P) 7   If you checked off any problems, how difficult have these problems made it for you to do your work, take care of things at home, or get along with other people? (P) somewhat difficult     PHQ-9 Total Score: (P) 7      Generalized Anxiety Disorder 7-Item (ASHLEY-7) Screening  Tool  Feeling nervous, anxious or on edge: (P) More than half the days  Not being able to stop or control worrying: (P) Several days  Worrying too much about different things: (P) Several days  Trouble Relaxing: (P) Several days  Being so restless that it is hard to sit still: (P) Several days  Feeling afraid as if something awful might happen: (P) Not at all  Becoming easily annoyed or irritable: (P) Several days  ASHLEY 7 Total Score: (P) 7  If you checked any problems, how difficult have these problems made it for you to do your work, take care of things at home, or get along with other people: (P) Somewhat difficult    The following portions of the patient's history were reviewed and updated as appropriate: allergies, current medications, past family history, past medical history, past social history, past surgical history and problem list.    Past Psychiatric History:     Began Treatment: 2022  Diagnoses: ADHD  Psychiatrist: Denies  Therapist: Denies  Admission History: Denies  Medication Trials: Strattera, wellbutrin  Self Harm: Denies  Suicide Attempts: Denies  Denies any history of a head injury or seizure  Denies any history of psychosis or hallucinations    Past Medical History:  Past Medical History:   Diagnosis Date    ADHD     Anxiety 06/2020    Seasonal allergies 08/31/2022    Sty 07/03/2024    L EYE LOWER LID       Substance Abuse History:   Types:Denies all, including illicit       Social History:  Social History     Socioeconomic History    Marital status: Single   Tobacco Use    Smoking status: Never     Passive exposure: Never    Smokeless tobacco: Never   Vaping Use    Vaping status: Never Used   Substance and Sexual Activity    Alcohol use: Not Currently    Drug use: Never    Sexual activity: Defer   Patient reports support system consisting of his family.  He is gainfully employed in [employer] for the last 3 years.  Patient reports he is currently in his last semester of college seeking a degree  in business management.  Denies any legal issues, no history of  service.  Anabaptism beliefs listed as Druze    Family History:  Family History   Problem Relation Age of Onset    Hypertension Father        Past Surgical History:  Past Surgical History:   Procedure Laterality Date    WISDOM TOOTH EXTRACTION         Problem List:  Patient Active Problem List   Diagnosis    Cough    Seasonal allergies    Congestion of nasal sinus    Lack of concentration       Allergy:   No Known Allergies     Current Medications:   Current Outpatient Medications   Medication Sig Dispense Refill    amphetamine-dextroamphetamine (Adderall) 20 MG tablet Take 1 tablet by mouth 2 (Two) Times a Day. 60 tablet 0     No current facility-administered medications for this visit.       Review of Systems:    Review of Systems   Constitutional:  Positive for fatigue.   Psychiatric/Behavioral:  Positive for decreased concentration. The patient is nervous/anxious.          Physical Exam:   Physical Exam  Vitals reviewed.   Constitutional:       Appearance: Normal appearance. He is well-developed and well-groomed.   HENT:      Head: Normocephalic.   Neurological:      Mental Status: He is alert.   Psychiatric:         Attention and Perception: Attention and perception normal.         Mood and Affect: Affect normal. Mood is anxious.         Speech: Speech normal.         Behavior: Behavior normal. Behavior is cooperative.         Thought Content: Thought content normal.         Cognition and Memory: Cognition and memory normal.         Judgment: Judgment normal.         Vitals:  There were no vitals taken for this visit. There is no height or weight on file to calculate BMI.  Due to extenuating circumstances and possible current health risks associated with the patient being present in a clinical setting (with current health restrictions in place in regards to possible COVID 19 transmission/exposure), the patient was seen remotely today via  a MyChart Video Visit through [facility] and telephone encounter.  Unable to obtain vital signs due to nature of remote visit.  Height stated at 70 inches.  Weight stated at 180 pounds.    Last 3 Blood Pressure Readings:  BP Readings from Last 3 Encounters:   07/03/24 126/78   06/14/23 125/73   06/13/23 122/72            Mental Status Exam:   Hygiene:   good  Cooperation:  Cooperative  Eye Contact:  Good  Psychomotor Behavior:  Appropriate  Affect:  Full range  Mood: anxious  Hopelessness: Denies  Speech:  Normal  Thought Process:  Goal directed and Linear  Thought Content:  Normal  Suicidal:  None  Homicidal:  None  Hallucinations:  None  Delusion:  None  Memory:  Intact  Orientation:  Person, Place, Time, and Situation  Reliability:  good  Insight:  Good  Judgement:  Good  Impulse Control:  Good  Physical/Medical Issues:  No        Lab Results:   No visits with results within 6 Month(s) from this visit.   Latest known visit with results is:   Lab on 11/02/2023   Component Date Value Ref Range Status    Amphet/Methamphet, Screen 11/02/2023 Positive (A)  Negative Final    Barbiturates Screen, Urine 11/02/2023 Negative  Negative Final    Benzodiazepine Screen, Urine 11/02/2023 Negative  Negative Final    Cocaine Screen, Urine 11/02/2023 Negative  Negative Final    Opiate Screen 11/02/2023 Negative  Negative Final    THC, Screen, Urine 11/02/2023 Negative  Negative Final    Methadone Screen, Urine 11/02/2023 Negative  Negative Final    Oxycodone Screen, Urine 11/02/2023 Negative  Negative Final    Fentanyl, Urine 11/02/2023 Negative  Negative Final            Assessment & Plan   Diagnoses and all orders for this visit:    1. ADHD (attention deficit hyperactivity disorder), inattentive type (Primary)  -     amphetamine-dextroamphetamine (Adderall) 20 MG tablet; Take 1 tablet by mouth 2 (Two) Times a Day.  Dispense: 60 tablet; Refill: 0    2. Anxiety disorder, unspecified type    3. Medication monitoring encounter    4.  "Encounter for long-term (current) use of other medications  -     Urine Drug Screen - Urine, Clean Catch        Visit Diagnoses:    ICD-10-CM ICD-9-CM   1. ADHD (attention deficit hyperactivity disorder), inattentive type  F90.0 314.00   2. Anxiety disorder, unspecified type  F41.9 300.00   3. Medication monitoring encounter  Z51.81 V58.83   4. Encounter for long-term (current) use of other medications  Z79.899 V58.69         GOALS:  Short Term Goals: Patient will be compliant with medication, and patient will have no significant medication related side effects.  Patient will be engaged in psychotherapy as indicated.  Patient will report subjective improvement of symptoms.  Long term goals: To stabilize mood and treat/improve subjective symptoms, the patient will stay out of the hospital, the patient will be at an optimal level of functioning, and the patient will take all medications as prescribed.  The patient/guardian verbalized understanding and agreement with goals that were mutually set.    RISK ASSESSMENT  Current harm-to-self risk is reported by pt as \"none.\"  Current [id number] risk is reported by pt as \"none.\"   No suicidal thoughts, intent, plan is appreciated by this provider on this date of exam.   No homicidal thoughts, intent, plan is appreciated by this provider on this date.    TREATMENT PLAN: Continue supportive psychotherapy efforts and medications as indicated.   Pharmacological and Non-Pharmacological treatment options discussed during today's visit. Patient/Guardian acknowledged and verbally consented with current treatment plan and was educated on the importance of compliance with treatment and follow-up appointments.      MEDICATION ISSUES:  Discussed medication options and treatment plan of prescribed medication as well as the risks, benefits, any black box warnings, and side effects including potential falls, possible impaired driving, and metabolic adversities among others. Patient is " agreeable to call the office with any worsening of symptoms or onset of side effects, or if any concerns or questions arise.  The contact information for the office is made available to the patient. Patient is agreeable to call 911 or go to the nearest ER should they begin having any SI/HI, or if any urgent concerns arise. No medication side effects or related complaints today.     Controlled substance agreement sent to [facility] reviewed  Urine drug screen ordered per policy  Patient instructed to call 1 week prior to needing refills  Follow-up with primary care regarding fatigue    MEDS ORDERED DURING VISIT:  New Medications Ordered This Visit   Medications    amphetamine-dextroamphetamine (Adderall) 20 MG tablet     Sig: Take 1 tablet by mouth 2 (Two) Times a Day.     Dispense:  60 tablet     Refill:  0       Follow Up Appointment:   Return in about 3 months (around 10/7/2024) for Recheck, Video visit.               This document has been electronically signed by ANNIA Chiu  July 11, 2024 15:55 EDT    Dictated Utilizing Dragon Dictation: Part of this note may be an electronic transcription/translation of spoken language to printed text using the Dragon Dictation System.

## 2024-08-02 DIAGNOSIS — F90.0 ADHD (ATTENTION DEFICIT HYPERACTIVITY DISORDER), INATTENTIVE TYPE: ICD-10-CM

## 2024-08-05 RX ORDER — DEXTROAMPHETAMINE SACCHARATE, AMPHETAMINE ASPARTATE, DEXTROAMPHETAMINE SULFATE AND AMPHETAMINE SULFATE 5; 5; 5; 5 MG/1; MG/1; MG/1; MG/1
20 TABLET ORAL 2 TIMES DAILY
Qty: 60 TABLET | Refills: 0 | OUTPATIENT
Start: 2024-08-05

## 2024-08-05 RX ORDER — DEXTROAMPHETAMINE SACCHARATE, AMPHETAMINE ASPARTATE, DEXTROAMPHETAMINE SULFATE AND AMPHETAMINE SULFATE 5; 5; 5; 5 MG/1; MG/1; MG/1; MG/1
1 TABLET ORAL 2 TIMES DAILY
Qty: 60 TABLET | Refills: 0 | OUTPATIENT
Start: 2024-08-05

## 2024-08-05 NOTE — TELEPHONE ENCOUNTER
Patient called office back and has been made aware  UDS order needs complete and also he will need to read and sign controlled substance agreement that has been sent to his [facility].

## 2024-08-05 NOTE — TELEPHONE ENCOUNTER
Needs to get UDS which was ordered and also [facility]

## 2024-08-06 ENCOUNTER — LAB (OUTPATIENT)
Dept: LAB | Facility: HOSPITAL | Age: 19
End: 2024-08-06
Payer: COMMERCIAL

## 2024-08-06 DIAGNOSIS — Z79.899 ENCOUNTER FOR LONG-TERM (CURRENT) USE OF OTHER MEDICATIONS: Primary | ICD-10-CM

## 2024-08-06 LAB
AMPHET+METHAMPHET UR QL: POSITIVE
BARBITURATES UR QL SCN: NEGATIVE
BENZODIAZ UR QL SCN: NEGATIVE
CANNABINOIDS SERPL QL: POSITIVE
COCAINE UR QL: NEGATIVE
FENTANYL UR-MCNC: NEGATIVE NG/ML
METHADONE UR QL SCN: NEGATIVE
OPIATES UR QL: NEGATIVE
OXYCODONE UR QL SCN: NEGATIVE

## 2024-08-06 PROCEDURE — 80307 DRUG TEST PRSMV CHEM ANLYZR: CPT

## 2024-08-07 DIAGNOSIS — F90.0 ADHD (ATTENTION DEFICIT HYPERACTIVITY DISORDER), INATTENTIVE TYPE: ICD-10-CM

## 2024-08-07 RX ORDER — DEXTROAMPHETAMINE SACCHARATE, AMPHETAMINE ASPARTATE, DEXTROAMPHETAMINE SULFATE AND AMPHETAMINE SULFATE 5; 5; 5; 5 MG/1; MG/1; MG/1; MG/1
20 TABLET ORAL 2 TIMES DAILY
Qty: 60 TABLET | Refills: 0 | Status: SHIPPED | OUTPATIENT
Start: 2024-08-07

## 2024-08-07 NOTE — TELEPHONE ENCOUNTER
Spoke with pt re: drug screen results. Recent use of THC during a camping trip. Otherwise, not used in a long time. Denies ongoing use. Reviewed [facility] policy re: THC use and prescribing of controlled drugs.

## 2024-09-06 DIAGNOSIS — F90.0 ADHD (ATTENTION DEFICIT HYPERACTIVITY DISORDER), INATTENTIVE TYPE: ICD-10-CM

## 2024-09-09 RX ORDER — DEXTROAMPHETAMINE SACCHARATE, AMPHETAMINE ASPARTATE, DEXTROAMPHETAMINE SULFATE AND AMPHETAMINE SULFATE 5; 5; 5; 5 MG/1; MG/1; MG/1; MG/1
20 TABLET ORAL 2 TIMES DAILY
Qty: 60 TABLET | Refills: 0 | Status: SHIPPED | OUTPATIENT
Start: 2024-09-09

## 2024-10-07 ENCOUNTER — TELEMEDICINE (OUTPATIENT)
Dept: PSYCHIATRY | Facility: CLINIC | Age: 19
End: 2024-10-07
Payer: COMMERCIAL

## 2024-10-07 DIAGNOSIS — F41.1 GENERALIZED ANXIETY DISORDER: ICD-10-CM

## 2024-10-07 DIAGNOSIS — F90.0 ADHD (ATTENTION DEFICIT HYPERACTIVITY DISORDER), INATTENTIVE TYPE: Primary | ICD-10-CM

## 2024-10-07 PROCEDURE — 99214 OFFICE O/P EST MOD 30 MIN: CPT | Performed by: NURSE PRACTITIONER

## 2024-10-07 RX ORDER — DEXTROAMPHETAMINE SACCHARATE, AMPHETAMINE ASPARTATE, DEXTROAMPHETAMINE SULFATE AND AMPHETAMINE SULFATE 5; 5; 5; 5 MG/1; MG/1; MG/1; MG/1
20 TABLET ORAL 2 TIMES DAILY
Qty: 60 TABLET | Refills: 0 | Status: SHIPPED | OUTPATIENT
Start: 2024-10-07

## 2024-10-07 RX ORDER — BUSPIRONE HYDROCHLORIDE 5 MG/1
5 TABLET ORAL 3 TIMES DAILY
Qty: 90 TABLET | Refills: 2 | Status: SHIPPED | OUTPATIENT
Start: 2024-10-07

## 2024-10-07 NOTE — PROGRESS NOTES
"This provider is located at home office address in [place] for [facility], [address]  [facility], [address] using a secure MyChart Video Visit through [employer]. Patient is being seen remotely via telehealth at their home address in [place], and stated they are in a secure environment for this session. The patient's condition being diagnosed/treated is appropriate for telemedicine. The provider identified herself as well as her credentials.   The patient, and/or patients guardian, consent to be seen remotely, and when consent is given they understand that the consent allows for patient identifiable information to be sent to a third party as needed.   They may refuse to be seen remotely at any time. The electronic data is encrypted and password protected, and the patient and/or guardian has been advised of the potential risks to privacy not withstanding such measures.   PT Identifiers used: Name and .    You have chosen to receive care through a telehealth visit.  Do you consent to use a video/audio connection for your medical care today? Yes  Patient verbally confirmed consent for today's encounter 10/07/2024      Subjective   Johnathan Fan is a 19 y.o. male who presents today for  follow up    Chief Complaint:  \"ADHD, anxiety\"    History of Present Illness:     History of Present Illness   Patient reports he had problems with attention deficit in high school, he never had treatment for it.  He has also had some anxiety and tried Wellbutrin but only took it for a couple weeks but it did not help.  He reports his anxiety is primarily around his performance at work.  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  He does report sleep is good and appetite overall is good although sometimes he overeats a little bit.  He is in college and has 1 semester left looking for a business management degree.  Currently lives with his parents he has an older " brother who lives in [place] everyone gets along well.  Patient has a couple of dogs for pets.  [place].  ADHD symptoms consist primarily of inattentive to details, difficulty with organization, difficulty with task completion, task initiation, poor focus, easily distracted.  He reports medication has been beneficial to help with the symptoms.  Patient today was asking about something for the anxiety.  He continues to have anxiety primarily around his performance at work.  We discussed starting low dose of BuSpar, and although I am ordering it 3 times a day he is instructed to take it twice a day until he knows if the medicine is going to give him any side effects or not.  Also instructed to start taking it on the weekend with his first dose being in the evening just in case it makes him tired.             The following portions of the patient's history were reviewed and updated as appropriate: allergies, current medications, past family history, past medical history, past social history, past surgical history and problem list.    Past Psychiatric History:     Began Treatment: 2022  Diagnoses: ADHD  Psychiatrist: Denies  Therapist: Denies  Admission History: Denies  Medication Trials: Strattera, wellbutrin  Self Harm: Denies  Suicide Attempts: Denies  Denies any history of a head injury or seizure  Denies any history of psychosis or hallucinations    Past Medical History:  Past Medical History:   Diagnosis Date    ADHD     Anxiety 06/2020    Seasonal allergies 08/31/2022    Sty 07/03/2024    L EYE LOWER LID       Substance Abuse History:   Types:Denies all, including illicit       Social History:  Social History     Socioeconomic History    Marital status: Single   Tobacco Use    Smoking status: Never     Passive exposure: Never    Smokeless tobacco: Never   Vaping Use    Vaping status: Never Used   Substance and Sexual Activity    Alcohol use: Not Currently     Drug use: Never    Sexual activity: Defer   Patient reports support system consisting of his family.  He is gainfully employed in [employer] for the last 3 years.  Patient reports he is currently in his last semester of college seeking a degree in business management.  Denies any legal issues, no history of  service.  Hoahaoism beliefs listed as Sikh    Family History:  Family History   Problem Relation Age of Onset    Hypertension Father        Past Surgical History:  Past Surgical History:   Procedure Laterality Date    WISDOM TOOTH EXTRACTION         Problem List:  Patient Active Problem List   Diagnosis    Cough    Seasonal allergies    Congestion of nasal sinus    Lack of concentration       Allergy:   No Known Allergies     Current Medications:   Current Outpatient Medications   Medication Sig Dispense Refill    amphetamine-dextroamphetamine (Adderall) 20 MG tablet Take 1 tablet by mouth 2 (Two) Times a Day. 60 tablet 0    busPIRone (BUSPAR) 5 MG tablet Take 1 tablet by mouth 3 (Three) Times a Day. 90 tablet 2     No current facility-administered medications for this visit.       Review of Systems:    Review of Systems   Psychiatric/Behavioral:  The patient is nervous/anxious.          Physical Exam:   Physical Exam  Vitals reviewed.   Constitutional:       Appearance: Normal appearance. He is well-developed and well-groomed.   HENT:      Head: Normocephalic.   Neurological:      Mental Status: He is alert.   Psychiatric:         Attention and Perception: Attention and perception normal.         Mood and Affect: Affect normal. Mood is anxious.         Speech: Speech normal.         Behavior: Behavior normal. Behavior is cooperative.         Thought Content: Thought content normal.         Cognition and Memory: Cognition and memory normal.         Judgment: Judgment normal.         Vitals:  There were no vitals taken for this visit. There is no height or weight on file to calculate BMI.  Due to  extenuating circumstances and possible current health risks associated with the patient being present in a clinical setting (with current health restrictions in place in regards to possible COVID 19 transmission/exposure), the patient was seen remotely today via a MyChart Video Visit through [facility] and telephone encounter.  Unable to obtain vital signs due to nature of remote visit.  Height stated at 70 inches.  Weight stated at 180 pounds.    Last 3 Blood Pressure Readings:  BP Readings from Last 3 Encounters:   07/03/24 126/78   06/14/23 125/73   06/13/23 122/72            Mental Status Exam:   Hygiene:   good  Cooperation:  Cooperative  Eye Contact:  Good  Psychomotor Behavior:  Appropriate  Affect:  Full range  Mood: anxious  Hopelessness: Denies  Speech:  Normal  Thought Process:  Goal directed and Linear  Thought Content:  Normal  Suicidal:  None  Homicidal:  None  Hallucinations:  None  Delusion:  None  Memory:  Intact  Orientation:  Person, Place, Time, and Situation  Reliability:  good  Insight:  Good  Judgement:  Good  Impulse Control:  Good  Physical/Medical Issues:  No        Lab Results:   Lab on 08/06/2024   Component Date Value Ref Range Status    Amphet/Methamphet, Screen 08/06/2024 Positive (A)  Negative Final    Barbiturates Screen, Urine 08/06/2024 Negative  Negative Final    Benzodiazepine Screen, Urine 08/06/2024 Negative  Negative Final    Cocaine Screen, Urine 08/06/2024 Negative  Negative Final    Opiate Screen 08/06/2024 Negative  Negative Final    THC, Screen, Urine 08/06/2024 Positive (A)  Negative Final    Methadone Screen, Urine 08/06/2024 Negative  Negative Final    Oxycodone Screen, Urine 08/06/2024 Negative  Negative Final    Fentanyl, Urine 08/06/2024 Negative  Negative Final            Assessment & Plan   Diagnoses and all orders for this visit:    1. ADHD (attention deficit hyperactivity disorder), inattentive type (Primary)  -     amphetamine-dextroamphetamine (Adderall) 20 MG  "tablet; Take 1 tablet by mouth 2 (Two) Times a Day.  Dispense: 60 tablet; Refill: 0    2. Generalized anxiety disorder  -     busPIRone (BUSPAR) 5 MG tablet; Take 1 tablet by mouth 3 (Three) Times a Day.  Dispense: 90 tablet; Refill: 2          Visit Diagnoses:    ICD-10-CM ICD-9-CM   1. ADHD (attention deficit hyperactivity disorder), inattentive type  F90.0 314.00   2. Generalized anxiety disorder  F41.1 300.02           GOALS:  Short Term Goals: Patient will be compliant with medication, and patient will have no significant medication related side effects.  Patient will be engaged in psychotherapy as indicated.  Patient will report subjective improvement of symptoms.  Long term goals: To stabilize mood and treat/improve subjective symptoms, the patient will stay out of the hospital, the patient will be at an optimal level of functioning, and the patient will take all medications as prescribed.  The patient/guardian verbalized understanding and agreement with goals that were mutually set.    RISK ASSESSMENT  Current harm-to-self risk is reported by pt as \"none.\"  Current [id number] risk is reported by pt as \"none.\"   No suicidal thoughts, intent, plan is appreciated by this provider on this date of exam.   No homicidal thoughts, intent, plan is appreciated by this provider on this date.    TREATMENT PLAN: Continue supportive psychotherapy efforts and medications as indicated.   Pharmacological and Non-Pharmacological treatment options discussed during today's visit. Patient/Guardian acknowledged and verbally consented with current treatment plan and was educated on the importance of compliance with treatment and follow-up appointments.      MEDICATION ISSUES:  Discussed medication options and treatment plan of prescribed medication as well as the risks, benefits, any black box warnings, and side effects including potential falls, possible impaired driving, and metabolic adversities among others. Patient is " agreeable to call the office with any worsening of symptoms or onset of side effects, or if any concerns or questions arise.  The contact information for the office is made available to the patient. Patient is agreeable to call 911 or go to the nearest ER should they begin having any SI/HI, or if any urgent concerns arise. No medication side effects or related complaints today.     Continue Adderall 20 mg tablet take 1 tablet by mouth twice a day for ADHD  Start buspirone, 5 mg tablet take 1 tablet by mouth twice a day, may increase to 3 times a day in the future.    MEDS ORDERED DURING VISIT:  New Medications Ordered This Visit   Medications    amphetamine-dextroamphetamine (Adderall) 20 MG tablet     Sig: Take 1 tablet by mouth 2 (Two) Times a Day.     Dispense:  60 tablet     Refill:  0    busPIRone (BUSPAR) 5 MG tablet     Sig: Take 1 tablet by mouth 3 (Three) Times a Day.     Dispense:  90 tablet     Refill:  2       Follow Up Appointment:   Return in about 6 weeks (around 11/18/2024) for Recheck, Video visit.               This document has been electronically signed by ANNIA Chiu  October 7, 2024 09:44 EDT    Dictated Utilizing Dragon Dictation: Part of this note may be an electronic transcription/translation of spoken language to printed text using the Dragon Dictation System.

## 2024-10-07 NOTE — PATIENT INSTRUCTIONS
For concerns or needing assistance call the [facility] at [phone]  Medication refills:- Refill requests are addressed M-Th. No refills will be sent in on Fridays, weekends or federal holidays.   Please be aware of your need for refills and call one week before needing medication refilled so it can be filled in a timely manner.   Start buspar 5mg tablet twice daily  Continue with adderall 20mg twice daily.

## 2024-11-18 ENCOUNTER — TELEMEDICINE (OUTPATIENT)
Dept: PSYCHIATRY | Facility: CLINIC | Age: 19
End: 2024-11-18
Payer: COMMERCIAL

## 2024-11-18 DIAGNOSIS — F41.1 GENERALIZED ANXIETY DISORDER: Primary | ICD-10-CM

## 2024-11-18 DIAGNOSIS — F90.0 ADHD (ATTENTION DEFICIT HYPERACTIVITY DISORDER), INATTENTIVE TYPE: ICD-10-CM

## 2024-11-18 PROCEDURE — 96127 BRIEF EMOTIONAL/BEHAV ASSMT: CPT | Performed by: NURSE PRACTITIONER

## 2024-11-18 PROCEDURE — 99214 OFFICE O/P EST MOD 30 MIN: CPT | Performed by: NURSE PRACTITIONER

## 2024-11-18 RX ORDER — BUSPIRONE HYDROCHLORIDE 10 MG/1
10 TABLET ORAL 3 TIMES DAILY
Qty: 90 TABLET | Refills: 3 | Status: SHIPPED | OUTPATIENT
Start: 2024-11-18

## 2024-11-18 RX ORDER — DEXTROAMPHETAMINE SACCHARATE, AMPHETAMINE ASPARTATE, DEXTROAMPHETAMINE SULFATE AND AMPHETAMINE SULFATE 5; 5; 5; 5 MG/1; MG/1; MG/1; MG/1
20 TABLET ORAL 2 TIMES DAILY
Qty: 60 TABLET | Refills: 0 | Status: SHIPPED | OUTPATIENT
Start: 2024-11-18

## 2024-11-18 RX ORDER — BUSPIRONE HYDROCHLORIDE 10 MG/1
5 TABLET ORAL 3 TIMES DAILY
Qty: 90 TABLET | Refills: 3 | Status: SHIPPED | OUTPATIENT
Start: 2024-11-18 | End: 2024-11-18 | Stop reason: SDUPTHER

## 2024-11-18 NOTE — PATIENT INSTRUCTIONS
For concerns or needing assistance call the [facility] at [phone]  Continue Adderall 20 mg tablet take 1 tablet by mouth twice a day for ADHD  Increase BuSpar to 10 mg tablets -1 tablet 3 times a day

## 2024-11-18 NOTE — PROGRESS NOTES
"Mode of Visit: Video  Location of patient: -HOME-  Location of provider: +HOME+  You have chosen to receive care through a telehealth visit.  The patient has signed the video visit consent form.  The visit included audio and video interaction. No technical issues occurred during this visit.  This provider is located at home office address in [place] for [facility], [address], [address] using a secure Virtual Psychology System[employer]art Video Visit through "[employer] Patient is being seen remotely via telehealth at their home address in [place], and stated they are in a secure environment for this session. The patient's condition being diagnosed/treated is appropriate for telemedicine. The provider identified herself as well as her credentials.   The patient, and/or patients guardian, consent to be seen remotely, and when consent is given they understand that the consent allows for patient identifiable information to be sent to a third party as needed.   They may refuse to be seen remotely at any time. The electronic data is encrypted and password protected, and the patient and/or guardian has been advised of the potential risks to privacy not withstanding such measures.   PT Identifiers used: Name and .    You have chosen to receive care through a telehealth visit.  Do you consent to use a video/audio connection for your medical care today? Yes  Patient verbally confirmed consent for today's encounter 2024      Subjective   Johnathan Fan is a 19 y.o. male who presents today for  follow up    Chief Complaint:  \"ADHD, anxiety\"    History of Present Illness:     History of Present Illness   Patient reports he had problems with attention deficit in high school, he never had treatment for it.  He has also had some anxiety and tried Wellbutrin but only took it for a couple weeks but it did not help.  He reports his anxiety is primarily around his performance at work and with college classes.  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  He does report sleep is good and appetite overall is good although sometimes he overeats a little bit.  He is in college and is scheduled to graduate in December 2024 with real estate/business management degree.  Currently lives with his parents he has an older brother who lives in [place] everyone gets along well.  Patient has a couple of dogs for pets.  For fun he likes to go to the [place] and be out on the boat.  ADHD symptoms consist primarily of inattentive to details, difficulty with organization, difficulty with task completion, task initiation, poor focus, easily distracted.  He reports medication has been beneficial to help with the symptoms.  Started on anxiety medicine BuSpar at last encounter.  Patient reports in the beginning it seemed like it was helping but now it does not seem like it is doing much at all.  Anxiety screen today is scored at 3.  Agreeable to increase in the BuSpar; hopefully anxiety will get better once he completes his degree in December.        PHQ-9 Depression Screening  Little interest or pleasure in doing things? (Patient-Rptd) Several days   Feeling down, depressed, or hopeless? (Patient-Rptd) Several days   PHQ-2 Total Score (Patient-Rptd) 2   Trouble falling or staying asleep, or sleeping too much? (Patient-Rptd) Several days   Feeling tired or having little energy?     Poor appetite or overeating? (Patient-Rptd) Several days   Feeling bad about yourself - or that you are a failure or have let yourself or your family down? (Patient-Rptd) Not at all   Trouble concentrating on things, such as reading the newspaper or watching television? (Patient-Rptd) Several days   Moving or speaking so slowly that other people could have noticed? Or the opposite - being so fidgety or restless that you have been moving around a lot more than usual? (Patient-Rptd) Not at all   Thoughts that you would be  better off dead, or of hurting yourself in some way? (Patient-Rptd) Not at all   PHQ-9 Total Score     If you checked off any problems, how difficult have these problems made it for you to do your work, take care of things at home, or get along with other people? (Patient-Rptd) Somewhat difficult          Feeling nervous, anxious or on edge: (Patient-Rptd) Several days  Not being able to stop or control worrying: (Patient-Rptd) Several days  Worrying too much about different things: (Patient-Rptd) Several days  Trouble Relaxing: (Patient-Rptd) Not at all  Being so restless that it is hard to sit still: (Patient-Rptd) Not at all  Feeling afraid as if something awful might happen: (Patient-Rptd) Not at all  Becoming easily annoyed or irritable: (Patient-Rptd) Not at all  ASHLEY 7 Total Score: (Patient-Rptd) 3  If you checked any problems, how difficult have these problems made it for you to do your work, take care of things at home, or get along with other people: (Patient-Rptd) Somewhat difficult    The following portions of the patient's history were reviewed and updated as appropriate: allergies, current medications, past family history, past medical history, past social history, past surgical history and problem list.    Past Psychiatric History:     Began Treatment: 2022  Diagnoses: ADHD  Psychiatrist: Denies  Therapist: Denies  Admission History: Denies  Medication Trials: Strattera, wellbutrin  Self Harm: Denies  Suicide Attempts: Denies  Denies any history of a head injury or seizure  Denies any history of psychosis or hallucinations    Past Medical History:  Past Medical History:   Diagnosis Date    ADHD     Anxiety 06/2020    Seasonal allergies 08/31/2022    Sty 07/03/2024    L EYE LOWER LID       Substance Abuse History:   Types:Denies all, including illicit       Social History:  Social History     Socioeconomic History    Marital status: Single   Tobacco Use    Smoking status: Never     Passive exposure:  Never    Smokeless tobacco: Never   Vaping Use    Vaping status: Never Used   Substance and Sexual Activity    Alcohol use: Not Currently    Drug use: Never    Sexual activity: Defer   Patient reports support system consisting of his family.  He is gainfully employed in [employer] for the last 3 years.  Patient reports he is currently in his last semester of college seeking a degree in business management.  Denies any legal issues, no history of  service.  Gnosticism beliefs listed as Mu-ism    Family History:  Family History   Problem Relation Age of Onset    Hypertension Father        Past Surgical History:  Past Surgical History:   Procedure Laterality Date    WISDOM TOOTH EXTRACTION         Problem List:  Patient Active Problem List   Diagnosis    Cough    Seasonal allergies    Congestion of nasal sinus    Lack of concentration       Allergy:   No Known Allergies     Current Medications:   Current Outpatient Medications   Medication Sig Dispense Refill    amphetamine-dextroamphetamine (Adderall) 20 MG tablet Take 1 tablet by mouth 2 (Two) Times a Day. 60 tablet 0    busPIRone (BUSPAR) 10 MG tablet Take 1 tablet by mouth 3 (Three) Times a Day. 90 tablet 3     No current facility-administered medications for this visit.       Review of Systems:    Review of Systems   Psychiatric/Behavioral:  The patient is nervous/anxious.          Physical Exam:   Physical Exam  Vitals reviewed.   Constitutional:       Appearance: Normal appearance. He is well-developed and well-groomed.   HENT:      Head: Normocephalic.   Neurological:      Mental Status: He is alert.   Psychiatric:         Attention and Perception: Attention and perception normal.         Mood and Affect: Affect normal. Mood is anxious.         Speech: Speech normal.         Behavior: Behavior normal. Behavior is cooperative.         Thought Content: Thought content normal.         Cognition and Memory: Cognition and memory normal.         Judgment:  Judgment normal.         Vitals:  There were no vitals taken for this visit. There is no height or weight on file to calculate BMI.  Due to extenuating circumstances and possible current health risks associated with the patient being present in a clinical setting (with current health restrictions in place in regards to possible COVID 19 transmission/exposure), the patient was seen remotely today via a MyChart Video Visit through [facility] and telephone encounter.  Unable to obtain vital signs due to nature of remote visit.  Height stated at 70 inches.  Weight stated at 180 pounds.    Last 3 Blood Pressure Readings:  BP Readings from Last 3 Encounters:   07/03/24 126/78   06/14/23 125/73   06/13/23 122/72            Mental Status Exam:   Hygiene:   good  Cooperation:  Cooperative  Eye Contact:  Good  Psychomotor Behavior:  Appropriate  Affect:  Full range  Mood: anxious  Hopelessness: Denies  Speech:  Normal  Thought Process:  Goal directed and Linear  Thought Content:  Normal  Suicidal:  None  Homicidal:  None  Hallucinations:  None  Delusion:  None  Memory:  Intact  Orientation:  Person, Place, Time, and Situation  Reliability:  good  Insight:  Good  Judgement:  Good  Impulse Control:  Good  Physical/Medical Issues:  No        Lab Results:   Lab on 08/06/2024   Component Date Value Ref Range Status    Amphet/Methamphet, Screen 08/06/2024 Positive (A)  Negative Final    Barbiturates Screen, Urine 08/06/2024 Negative  Negative Final    Benzodiazepine Screen, Urine 08/06/2024 Negative  Negative Final    Cocaine Screen, Urine 08/06/2024 Negative  Negative Final    Opiate Screen 08/06/2024 Negative  Negative Final    THC, Screen, Urine 08/06/2024 Positive (A)  Negative Final    Methadone Screen, Urine 08/06/2024 Negative  Negative Final    Oxycodone Screen, Urine 08/06/2024 Negative  Negative Final    Fentanyl, Urine 08/06/2024 Negative  Negative Final            Assessment & Plan   Diagnoses and all orders for this  "visit:    1. Generalized anxiety disorder (Primary)  -     Discontinue: busPIRone (BUSPAR) 10 MG tablet; Take 0.5 tablets by mouth 3 (Three) Times a Day.  Dispense: 90 tablet; Refill: 3  -     busPIRone (BUSPAR) 10 MG tablet; Take 1 tablet by mouth 3 (Three) Times a Day.  Dispense: 90 tablet; Refill: 3    2. ADHD (attention deficit hyperactivity disorder), inattentive type  -     amphetamine-dextroamphetamine (Adderall) 20 MG tablet; Take 1 tablet by mouth 2 (Two) Times a Day.  Dispense: 60 tablet; Refill: 0            Visit Diagnoses:    ICD-10-CM ICD-9-CM   1. Generalized anxiety disorder  F41.1 300.02   2. ADHD (attention deficit hyperactivity disorder), inattentive type  F90.0 314.00             GOALS:  Short Term Goals: Patient will be compliant with medication, and patient will have no significant medication related side effects.  Patient will be engaged in psychotherapy as indicated.  Patient will report subjective improvement of symptoms.  Long term goals: To stabilize mood and treat/improve subjective symptoms, the patient will stay out of the hospital, the patient will be at an optimal level of functioning, and the patient will take all medications as prescribed.  The patient/guardian verbalized understanding and agreement with goals that were mutually set.    RISK ASSESSMENT  Current harm-to-self risk is reported by pt as \"none.\"  Current [id number] risk is reported by pt as \"none.\"   No suicidal thoughts, intent, plan is appreciated by this provider on this date of exam.   No homicidal thoughts, intent, plan is appreciated by this provider on this date.    TREATMENT PLAN: Continue supportive psychotherapy efforts and medications as indicated.   Pharmacological and Non-Pharmacological treatment options discussed during today's visit. Patient/Guardian acknowledged and verbally consented with current treatment plan and was educated on the importance of compliance with treatment and follow-up " appointments.      MEDICATION ISSUES:  Discussed medication options and treatment plan of prescribed medication as well as the risks, benefits, any black box warnings, and side effects including potential falls, possible impaired driving, and metabolic adversities among others. Patient is agreeable to call the office with any worsening of symptoms or onset of side effects, or if any concerns or questions arise.  The contact information for the office is made available to the patient. Patient is agreeable to call 911 or go to the nearest ER should they begin having any SI/HI, or if any urgent concerns arise. No medication side effects or related complaints today.     Continue Adderall 20 mg tablet take 1 tablet by mouth twice a day for ADHD  Increase BuSpar to 10 mg tablets -1 tablet 3 times a day    MEDS ORDERED DURING VISIT:  New Medications Ordered This Visit   Medications    amphetamine-dextroamphetamine (Adderall) 20 MG tablet     Sig: Take 1 tablet by mouth 2 (Two) Times a Day.     Dispense:  60 tablet     Refill:  0    busPIRone (BUSPAR) 10 MG tablet     Sig: Take 1 tablet by mouth 3 (Three) Times a Day.     Dispense:  90 tablet     Refill:  3     Dosage change DISREGARD ORDER SENT EARLIER TODAY THIS DATE. USE THIS ORDER.       Follow Up Appointment:   Return in about 3 months (around 2/5/2025) for Recheck, Video visit.               This document has been electronically signed by ANNIA Chiu  November 18, 2024 09:39 EST    Dictated Utilizing Dragon Dictation: Part of this note may be an electronic transcription/translation of spoken language to printed text using the Dragon Dictation System.

## 2025-03-05 ENCOUNTER — TELEMEDICINE (OUTPATIENT)
Dept: PSYCHIATRY | Facility: CLINIC | Age: 20
End: 2025-03-05
Payer: COMMERCIAL

## 2025-03-05 DIAGNOSIS — F90.0 ADHD (ATTENTION DEFICIT HYPERACTIVITY DISORDER), INATTENTIVE TYPE: Primary | ICD-10-CM

## 2025-03-05 DIAGNOSIS — F41.1 GENERALIZED ANXIETY DISORDER: ICD-10-CM

## 2025-03-05 RX ORDER — BUSPIRONE HYDROCHLORIDE 15 MG/1
15 TABLET ORAL 2 TIMES DAILY
Qty: 60 TABLET | Refills: 3 | Status: SHIPPED | OUTPATIENT
Start: 2025-03-05

## 2025-03-05 RX ORDER — DEXTROAMPHETAMINE SACCHARATE, AMPHETAMINE ASPARTATE, DEXTROAMPHETAMINE SULFATE AND AMPHETAMINE SULFATE 5; 5; 5; 5 MG/1; MG/1; MG/1; MG/1
20 TABLET ORAL 2 TIMES DAILY
Qty: 60 TABLET | Refills: 0 | Status: SHIPPED | OUTPATIENT
Start: 2025-03-05

## 2025-03-05 NOTE — PATIENT INSTRUCTIONS
For concerns or needing assistance call the [facility] at [phone]  Continue Adderall 20 mg tablet take 1 tablet by mouth twice a day for ADHD  Increase BuSpar to 15 mg tablets -1 tablet 2 times a day    Medication refills:- Refill requests are addressed M-Th. No refills will be sent in on Fridays, weekends or federal holidays.   Please be aware of your need for refills and call one week before needing medication refilled so it can be filled in a timely manner.

## 2025-03-05 NOTE — PROGRESS NOTES
"Mode of Visit: Video  Location of patient: -WORK- [facility]  Location of provider: +HOME+  You have chosen to receive care through a telehealth visit.  The patient has signed the video visit consent form.  The visit included audio and video interaction. No technical issues occurred during this visit.     Patient verbally confirmed consent for today's encounter 03/05/2025      Elysia Fan is a 19 y.o. male who presents today for  follow up    Chief Complaint:  \"ADHD, anxiety\"    History of Present Illness:     History of Present Illness   Patient reports he had problems with attention deficit in high school, he never had treatment for it.  He has also had some anxiety and tried Wellbutrin but only took it for a couple weeks but it did not help.  He reports his anxiety is primarily around his performance at work. 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  He does report sleep is good and appetite overall is good although sometimes he overeats a little bit.   Currently lives with his parents he has an older brother who lives in [place] everyone gets along well.  Patient has a couple of dogs for pets.  [place].  ADHD symptoms consist primarily of inattentive to details, difficulty with organization, difficulty with task completion, task initiation, poor focus, easily distracted.  He reports medication has been beneficial to help with the symptoms.  Patient has been on BuSpar now for several months.  Reported some efficacy.  He does forget to take the third dose, which is actually the second dose that he should be taking around lunchtime.  So basically he is taking it twice a day on most days.  He does report having a little bit of dizziness with most recent dosage change but that subsided within a day or 2.  He does think the medication helps.  Previous ASHLEY-7 score was " 3, today is scored at 2.  Agreeable to change to 15 mg dose but will take it only twice daily.        PHQ-9 Depression Screening  Little interest or pleasure in doing things? (Patient-Rptd) Not at all   Feeling down, depressed, or hopeless? (Patient-Rptd) Several days   PHQ-2 Total Score (Patient-Rptd) 1   Trouble falling or staying asleep, or sleeping too much? (Patient-Rptd) Not at all   Feeling tired or having little energy? (Patient-Rptd) Several days   Poor appetite or overeating? (Patient-Rptd) Not at all   Feeling bad about yourself - or that you are a failure or have let yourself or your family down? (Patient-Rptd) Not at all   Trouble concentrating on things, such as reading the newspaper or watching television? (Patient-Rptd) Several days   Moving or speaking so slowly that other people could have noticed? Or the opposite - being so fidgety or restless that you have been moving around a lot more than usual? (Patient-Rptd) Not at all   Thoughts that you would be better off dead, or of hurting yourself in some way? (Patient-Rptd) Not at all   PHQ-9 Total Score (Patient-Rptd) 3   If you checked off any problems, how difficult have these problems made it for you to do your work, take care of things at home, or get along with other people? (Patient-Rptd) Somewhat difficult          Feeling nervous, anxious or on edge: (Patient-Rptd) Several days  Not being able to stop or control worrying: (Patient-Rptd) Not at all  Worrying too much about different things: (Patient-Rptd) Several days  Trouble Relaxing: (Patient-Rptd) Not at all  Being so restless that it is hard to sit still: (Patient-Rptd) Not at all  Feeling afraid as if something awful might happen: (Patient-Rptd) Not at all  Becoming easily annoyed or irritable: (Patient-Rptd) Not at all  ASHLEY 7 Total Score: (Patient-Rptd) 2  If you checked any problems, how difficult have these problems made it for you to do your work, take care of things at home, or get  along with other people: (Patient-Rptd) Somewhat difficult    The following portions of the patient's history were reviewed and updated as appropriate: allergies, current medications, past family history, past medical history, past social history, past surgical history and problem list.    Past Psychiatric History:     Began Treatment: 2022  Diagnoses: ADHD  Psychiatrist: Denies  Therapist: Denies  Admission History: Denies  Medication Trials: Strattera, wellbutrin  Self Harm: Denies  Suicide Attempts: Denies  Denies any history of a head injury or seizure  Denies any history of psychosis or hallucinations    Past Medical History:  Past Medical History:   Diagnosis Date    ADHD     Anxiety 06/2020    Seasonal allergies 08/31/2022    Sty 07/03/2024    L EYE LOWER LID       Substance Abuse History:   Types:Denies all, including illicit       Social History:  Social History     Socioeconomic History    Marital status: Single   Tobacco Use    Smoking status: Never     Passive exposure: Never    Smokeless tobacco: Never   Vaping Use    Vaping status: Never Used   Substance and Sexual Activity    Alcohol use: Not Currently    Drug use: Never    Sexual activity: Defer   Patient reports support system consisting of his family.  He is gainfully employed in [employer] for>3 years.  Patient reports he is currently in his last semester of college seeking a degree in business management.  Denies any legal issues, no history of  service.  Jewish beliefs listed as Buddhism    Family History:  Family History   Problem Relation Age of Onset    Hypertension Father        Past Surgical History:  Past Surgical History:   Procedure Laterality Date    WISDOM TOOTH EXTRACTION         Problem List:  Patient Active Problem List   Diagnosis    Cough    Seasonal allergies    Congestion of nasal sinus    Lack of concentration       Allergy:   No Known Allergies     Current Medications:   Current Outpatient Medications   Medication  Sig Dispense Refill    amphetamine-dextroamphetamine (Adderall) 20 MG tablet Take 1 tablet by mouth 2 (Two) Times a Day. 60 tablet 0    busPIRone (BUSPAR) 15 MG tablet Take 1 tablet by mouth 2 (Two) Times a Day. 60 tablet 3     No current facility-administered medications for this visit.          Physical Exam:   Physical Exam  Constitutional:       Appearance: Normal appearance. He is well-developed and well-groomed.   HENT:      Head: Normocephalic.   Neurological:      Mental Status: He is alert.   Psychiatric:         Attention and Perception: Attention and perception normal.         Mood and Affect: Mood and affect normal.         Speech: Speech normal.         Behavior: Behavior normal. Behavior is cooperative.         Thought Content: Thought content normal.         Cognition and Memory: Cognition and memory normal.         Judgment: Judgment normal.         Vitals:  There were no vitals taken for this visit. There is no height or weight on file to calculate BMI.  Due to extenuating circumstances and possible current health risks associated with the patient being present in a clinical setting (with current health restrictions in place in regards to possible COVID 19 transmission/exposure), the patient was seen remotely today via a MyChart Video Visit through [facility] and telephone encounter.  Unable to obtain vital signs due to nature of remote visit.  Height stated at 70 inches.  Weight stated at 180 pounds.    Last 3 Blood Pressure Readings:  BP Readings from Last 3 Encounters:   07/03/24 126/78   06/14/23 125/73   06/13/23 122/72            Mental Status Exam:   Hygiene:   good  Cooperation:  Cooperative  Eye Contact:  Good  Psychomotor Behavior:  Appropriate  Affect:  Full range  Mood: anxious  Hopelessness: Denies  Speech:  Normal  Thought Process:  Goal directed and Linear  Thought Content:  Normal  Suicidal:  None  Homicidal:  None  Hallucinations:  None  Delusion:  None  Memory:  Intact  Orientation:   Person, Place, Time, and Situation  Reliability:  good  Insight:  Good  Judgement:  Good  Impulse Control:  Good  Physical/Medical Issues:  No        Lab Results:   No visits with results within 6 Month(s) from this visit.   Latest known visit with results is:   Lab on 08/06/2024   Component Date Value Ref Range Status    Amphet/Methamphet, Screen 08/06/2024 Positive (A)  Negative Final    Barbiturates Screen, Urine 08/06/2024 Negative  Negative Final    Benzodiazepine Screen, Urine 08/06/2024 Negative  Negative Final    Cocaine Screen, Urine 08/06/2024 Negative  Negative Final    Opiate Screen 08/06/2024 Negative  Negative Final    THC, Screen, Urine 08/06/2024 Positive (A)  Negative Final    Methadone Screen, Urine 08/06/2024 Negative  Negative Final    Oxycodone Screen, Urine 08/06/2024 Negative  Negative Final    Fentanyl, Urine 08/06/2024 Negative  Negative Final            Assessment & Plan   Diagnoses and all orders for this visit:    1. ADHD (attention deficit hyperactivity disorder), inattentive type (Primary)  -     amphetamine-dextroamphetamine (Adderall) 20 MG tablet; Take 1 tablet by mouth 2 (Two) Times a Day.  Dispense: 60 tablet; Refill: 0    2. Generalized anxiety disorder  -     busPIRone (BUSPAR) 15 MG tablet; Take 1 tablet by mouth 2 (Two) Times a Day.  Dispense: 60 tablet; Refill: 3        Visit Diagnoses:    ICD-10-CM ICD-9-CM   1. ADHD (attention deficit hyperactivity disorder), inattentive type  F90.0 314.00   2. Generalized anxiety disorder  F41.1 300.02         GOALS:  Short Term Goals: Patient will be compliant with medication, and patient will have no significant medication related side effects.  Patient will be engaged in psychotherapy as indicated.  Patient will report subjective improvement of symptoms.  Long term goals: To stabilize mood and treat/improve subjective symptoms, the patient will stay out of the hospital, the patient will be at an optimal level of functioning, and the  "patient will take all medications as prescribed.  The patient/guardian verbalized understanding and agreement with goals that were mutually set.    RISK ASSESSMENT  Current harm-to-self risk is reported by pt as \"none.\"  Current [id number] risk is reported by pt as \"none.\"   No suicidal thoughts, intent, plan is appreciated by this provider on this date of exam.   No homicidal thoughts, intent, plan is appreciated by this provider on this date.    TREATMENT PLAN: Continue supportive psychotherapy efforts and medications as indicated.   Pharmacological and Non-Pharmacological treatment options discussed during today's visit. Patient/Guardian acknowledged and verbally consented with current treatment plan and was educated on the importance of compliance with treatment and follow-up appointments.      MEDICATION ISSUES:  Discussed medication options and treatment plan of prescribed medication as well as the risks, benefits, any black box warnings, and side effects including potential falls, possible impaired driving, and metabolic adversities among others. Patient is agreeable to call the office with any worsening of symptoms or onset of side effects, or if any concerns or questions arise.  The contact information for the office is made available to the patient. Patient is agreeable to call 911 or go to the nearest ER should they begin having any SI/HI, or if any urgent concerns arise. No medication side effects or related complaints today.     Continue Adderall 20 mg tablet take 1 tablet by mouth twice a day for ADHD  Increase BuSpar to 15 mg tablets -1 tablet 2 times a day    Medication refills:- Refill requests are addressed M-Th. No refills will be sent in on Fridays, weekends or federal holidays.   Please be aware of your need for refills and call one week before needing medication refilled so it can be filled in a timely manner.     MEDS ORDERED DURING VISIT:  New Medications Ordered This Visit   Medications "    amphetamine-dextroamphetamine (Adderall) 20 MG tablet     Sig: Take 1 tablet by mouth 2 (Two) Times a Day.     Dispense:  60 tablet     Refill:  0    busPIRone (BUSPAR) 15 MG tablet     Sig: Take 1 tablet by mouth 2 (Two) Times a Day.     Dispense:  60 tablet     Refill:  3     Dosage change       Follow Up Appointment:   Return in about 3 months (around 6/9/2025) for Recheck, Video visit.               This document has been electronically signed by ANNIA Chiu  March 5, 2025 09:43 EST    Dictated Utilizing Dragon Dictation: Part of this note may be an electronic transcription/translation of spoken language to printed text using the Dragon Dictation System.

## 2025-04-25 DIAGNOSIS — F90.0 ADHD (ATTENTION DEFICIT HYPERACTIVITY DISORDER), INATTENTIVE TYPE: ICD-10-CM

## 2025-04-28 RX ORDER — DEXTROAMPHETAMINE SACCHARATE, AMPHETAMINE ASPARTATE, DEXTROAMPHETAMINE SULFATE AND AMPHETAMINE SULFATE 5; 5; 5; 5 MG/1; MG/1; MG/1; MG/1
20 TABLET ORAL 2 TIMES DAILY
Qty: 60 TABLET | Refills: 0 | Status: SHIPPED | OUTPATIENT
Start: 2025-04-28

## 2025-06-09 ENCOUNTER — TELEMEDICINE (OUTPATIENT)
Dept: PSYCHIATRY | Facility: CLINIC | Age: 20
End: 2025-06-09
Payer: COMMERCIAL

## 2025-06-09 DIAGNOSIS — F41.1 GENERALIZED ANXIETY DISORDER: ICD-10-CM

## 2025-06-09 DIAGNOSIS — F90.0 ADHD (ATTENTION DEFICIT HYPERACTIVITY DISORDER), INATTENTIVE TYPE: Primary | ICD-10-CM

## 2025-06-09 PROCEDURE — 96127 BRIEF EMOTIONAL/BEHAV ASSMT: CPT | Performed by: NURSE PRACTITIONER

## 2025-06-09 PROCEDURE — 99214 OFFICE O/P EST MOD 30 MIN: CPT | Performed by: NURSE PRACTITIONER

## 2025-06-09 RX ORDER — BUSPIRONE HYDROCHLORIDE 15 MG/1
15 TABLET ORAL 2 TIMES DAILY
Qty: 60 TABLET | Refills: 3 | Status: SHIPPED | OUTPATIENT
Start: 2025-06-09

## 2025-06-09 RX ORDER — DEXTROAMPHETAMINE SACCHARATE, AMPHETAMINE ASPARTATE, DEXTROAMPHETAMINE SULFATE AND AMPHETAMINE SULFATE 5; 5; 5; 5 MG/1; MG/1; MG/1; MG/1
20 TABLET ORAL 2 TIMES DAILY
Qty: 60 TABLET | Refills: 0 | Status: SHIPPED | OUTPATIENT
Start: 2025-06-09

## 2025-06-09 NOTE — PATIENT INSTRUCTIONS
For concerns or needing assistance call the [facility] at [phone]  Continue Adderall 20 mg tablet -try cutting the tablet in half and taking 10 mg twice daily.  Continue BuSpar 15 mg tablets -1 tablet 2 times a day    Medication refills:- Refill requests are addressed M-Th. No refills will be sent in on Fridays, weekends or federal holidays.   Please be aware of your need for refills and call one week before needing medication refilled so it can be filled in a timely manner.
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
## 2025-07-14 DIAGNOSIS — F90.0 ADHD (ATTENTION DEFICIT HYPERACTIVITY DISORDER), INATTENTIVE TYPE: ICD-10-CM

## 2025-07-14 RX ORDER — DEXTROAMPHETAMINE SACCHARATE, AMPHETAMINE ASPARTATE, DEXTROAMPHETAMINE SULFATE AND AMPHETAMINE SULFATE 5; 5; 5; 5 MG/1; MG/1; MG/1; MG/1
20 TABLET ORAL 2 TIMES DAILY
Qty: 60 TABLET | Refills: 0 | Status: SHIPPED | OUTPATIENT
Start: 2025-07-14

## 2025-08-11 ENCOUNTER — TELEMEDICINE (OUTPATIENT)
Dept: PSYCHIATRY | Facility: CLINIC | Age: 20
End: 2025-08-11
Payer: COMMERCIAL

## 2025-08-11 DIAGNOSIS — F41.1 GENERALIZED ANXIETY DISORDER: ICD-10-CM

## 2025-08-11 DIAGNOSIS — F90.0 ADHD (ATTENTION DEFICIT HYPERACTIVITY DISORDER), INATTENTIVE TYPE: Primary | ICD-10-CM

## 2025-08-11 PROCEDURE — 99214 OFFICE O/P EST MOD 30 MIN: CPT | Performed by: NURSE PRACTITIONER

## 2025-08-11 PROCEDURE — 96127 BRIEF EMOTIONAL/BEHAV ASSMT: CPT | Performed by: NURSE PRACTITIONER

## 2025-08-11 RX ORDER — BUSPIRONE HYDROCHLORIDE 15 MG/1
15 TABLET ORAL 2 TIMES DAILY
Qty: 60 TABLET | Refills: 11 | Status: SHIPPED | OUTPATIENT
Start: 2025-08-11

## 2025-08-11 RX ORDER — DEXTROAMPHETAMINE SACCHARATE, AMPHETAMINE ASPARTATE, DEXTROAMPHETAMINE SULFATE AND AMPHETAMINE SULFATE 5; 5; 5; 5 MG/1; MG/1; MG/1; MG/1
20 TABLET ORAL 2 TIMES DAILY
Qty: 60 TABLET | Refills: 0 | Status: SHIPPED | OUTPATIENT
Start: 2025-08-11